# Patient Record
Sex: MALE | Race: WHITE | NOT HISPANIC OR LATINO | Employment: OTHER | URBAN - METROPOLITAN AREA
[De-identification: names, ages, dates, MRNs, and addresses within clinical notes are randomized per-mention and may not be internally consistent; named-entity substitution may affect disease eponyms.]

---

## 2017-02-21 ENCOUNTER — TRANSFERRED RECORDS (OUTPATIENT)
Dept: HEALTH INFORMATION MANAGEMENT | Facility: CLINIC | Age: 62
End: 2017-02-21

## 2018-05-23 ENCOUNTER — OFFICE VISIT (OUTPATIENT)
Dept: PEDIATRICS | Facility: CLINIC | Age: 63
End: 2018-05-23
Payer: COMMERCIAL

## 2018-05-23 VITALS
HEART RATE: 89 BPM | SYSTOLIC BLOOD PRESSURE: 134 MMHG | BODY MASS INDEX: 25.91 KG/M2 | WEIGHT: 171 LBS | HEIGHT: 68 IN | TEMPERATURE: 98.5 F | OXYGEN SATURATION: 95 % | DIASTOLIC BLOOD PRESSURE: 82 MMHG

## 2018-05-23 DIAGNOSIS — M79.10 MYALGIA: ICD-10-CM

## 2018-05-23 DIAGNOSIS — F41.9 ANXIETY: ICD-10-CM

## 2018-05-23 DIAGNOSIS — R68.89 ABNORMAL CLINICAL FINDING: ICD-10-CM

## 2018-05-23 DIAGNOSIS — R09.82 POST-NASAL DRIP: Primary | ICD-10-CM

## 2018-05-23 DIAGNOSIS — J34.2 DEVIATED NASAL SEPTUM: ICD-10-CM

## 2018-05-23 PROCEDURE — 82306 VITAMIN D 25 HYDROXY: CPT | Performed by: INTERNAL MEDICINE

## 2018-05-23 PROCEDURE — 80048 BASIC METABOLIC PNL TOTAL CA: CPT | Performed by: INTERNAL MEDICINE

## 2018-05-23 PROCEDURE — 36415 COLL VENOUS BLD VENIPUNCTURE: CPT | Performed by: INTERNAL MEDICINE

## 2018-05-23 PROCEDURE — 84443 ASSAY THYROID STIM HORMONE: CPT | Performed by: INTERNAL MEDICINE

## 2018-05-23 PROCEDURE — 99214 OFFICE O/P EST MOD 30 MIN: CPT | Performed by: INTERNAL MEDICINE

## 2018-05-23 PROCEDURE — 82550 ASSAY OF CK (CPK): CPT | Performed by: INTERNAL MEDICINE

## 2018-05-23 PROCEDURE — 84439 ASSAY OF FREE THYROXINE: CPT | Performed by: INTERNAL MEDICINE

## 2018-05-23 RX ORDER — AZELASTINE 1 MG/ML
1 SPRAY, METERED NASAL 2 TIMES DAILY
Qty: 1 BOTTLE | Refills: 3 | Status: SHIPPED | OUTPATIENT
Start: 2018-05-23 | End: 2018-08-08

## 2018-05-23 NOTE — PATIENT INSTRUCTIONS
INSTRUCTIONS FOR TODAY:     cough-suspect this is due to post nasal drip     Start using Astelin spray daily, start Claritin or Allegra daily      muscles aches- checking labwork.       Dr Smalls

## 2018-05-23 NOTE — MR AVS SNAPSHOT
"              After Visit Summary   5/23/2018    Michael Campbell    MRN: [id number]           Patient Information     Date Of Birth          1955        Visit Information        Provider Department      5/23/2018 1:20 PM Colby Smalls MD [facility]        Today's Diagnoses     Post-nasal drip    -  1    Deviated nasal septum        Myalgia          Care Instructions    INSTRUCTIONS FOR TODAY:     cough-suspect this is due to post nasal drip     Start using Astelin spray daily, start Claritin or Allegra daily      muscles aches- checking labwork.       Dr Smalls            Follow-ups after your visit        Follow-up notes from your care team     Return in about 2 weeks (around 6/6/2018), or if symptoms worsen or fail to improve.      Who to contact     If you have questions or need follow up information about today's clinic visit or your schedule please contact [facility] directly at [phone].  Normal or non-critical lab and imaging results will be communicated to you by MyChart, letter or phone within 4 business days after the clinic has received the results. If you do not hear from us within 7 days, please contact the clinic through MyChart or phone. If you have a critical or abnormal lab result, we will notify you by phone as soon as possible.  Submit refill requests through [employer] or call your pharmacy and they will forward the refill request to us. Please allow 3 business days for your refill to be completed.          Additional Information About Your Visit        Care EveryWhere ID     This is your Care EveryWhere ID. This could be used by other organizations to access your [facility] medical records  [id number]        Your Vitals Were     Pulse Temperature Height Pulse Oximetry BMI (Body Mass Index)       89 98.5  F (36.9  C) (Oral) 5' 8\" (1.727 m) 95% 26 kg/m2        Blood Pressure from Last 3 Encounters:   05/23/18 134/82   03/28/15 (!) 142/93   05/06/11 122/88    " Weight from Last 3 Encounters:   05/23/18 171 lb (77.6 kg)   03/28/15 175 lb (79.4 kg)   05/08/12 180 lb (81.6 kg)              We Performed the Following     Basic metabolic panel     CK total     TSH with free T4 reflex     Vitamin D Deficiency          Today's Medication Changes          These changes are accurate as of 5/23/18  1:49 PM.  If you have any questions, ask your nurse or doctor.               Start taking these medicines.        Dose/Directions    azelastine 0.1 % spray   Commonly known as:  ASTELIN   Used for:  Post-nasal drip   Started by:  Colby Smalls MD        Dose:  1 spray   Spray 1 spray into both nostrils 2 times daily   Quantity:  1 Bottle   Refills:  3            Where to get your medicines      These medications were sent to [id number] - [place], [place] - [address]  [address], [place] [address]     Phone:  [phone]     azelastine 0.1 % spray                Primary Care Provider Office Phone # Fax #    Colby Smalls [phone]       [facility] [place] [address]        Equal Access to Services     [facility] AH: Hadii angie ku hadasho Soomaali, waaxda luqadaha, qaybta kaalmada adelesteryaronel, paty starks . So [facility] [phone].    ATENCIÓN: Si habla español, tiene a rehman disposición servicios gratuitos de asistencia lingüística. [facility] [phone].    We comply with applicable federal civil rights laws and [place] laws. We do not discriminate on the basis of race, color, national origin, age, disability, sex, sexual orientation, or gender identity.            Thank you!     Thank you for choosing [facility]  for your care. Our goal is always to provide you with excellent care. Hearing back from our patients is one way we can continue to improve our services. Please take a few minutes to complete the written survey that you may receive in the mail after your visit with us. Thank  you!             Your Updated Medication List - Protect others around you: Learn how to safely use, store and throw away your medicines at www.disposemymeds.org.          This list is accurate as of 5/23/18  1:49 PM.  Always use your most recent med list.                   Brand Name Dispense Instructions for use Diagnosis    azelastine 0.1 % spray    ASTELIN    1 Bottle    Spray 1 spray into both nostrils 2 times daily    Post-nasal drip       clonazePAM 0.5 MG tablet    klonoPIN    21 tablet    Take 1 tablet (0.5 mg) by mouth 3 times daily as needed for anxiety Next refill on or after 7/1/11.    Anxiety       traZODone 100 MG tablet    DESYREL    10 tablet    Take 2 tablets by mouth At Bedtime.    Anxiety, Insomnia

## 2018-05-23 NOTE — PROGRESS NOTES
SUBJECTIVE:   Michael Campbell is a 62 year old male who presents to clinic today for the following health issues:    Nasal congestion, discharge      Duration: 4 months    Description:   Nasal congestion: YES  Sneezing: YES  Red, itchy eyes: YES  H/o deviated septum    Accompanying signs and symptoms: none    History (similar episodes/allergy testing): as above, intermittent sx         Hx of deviated septum    Precipitating or alleviating factors: None    Therapies tried and outcome: none    C/o generalized myalgias  Sx mainly shoulders, arms, also hips, legs  Sx for past few months    Anxiety disorder, managed through psychiatry  Has not tolerated SSRI.   Current regimen-- clonazepam  Recent psychiatry notes reviewed, recent reduction in dose  Pt reports significant anxiety off medication    Patient Active Problem List   Diagnosis     Anxiety     Insomnia     CARDIOVASCULAR SCREENING; LDL GOAL LESS THAN 160     Hypertension     No past surgical history on file.    Social History   Substance Use Topics     Smoking status: Current Every Day Smoker     Packs/day: 0.50     Types: Cigarettes     Smokeless tobacco: Never Used     Alcohol use No     Family History   Problem Relation Age of Onset     Arthritis Mother      Rheumatoid          Current Outpatient Prescriptions   Medication Sig Dispense Refill     azelastine (ASTELIN) 0.1 % spray Spray 1 spray into both nostrils 2 times daily 1 Bottle 3     clonazePAM (KLONOPIN) 0.5 MG tablet Take 1 tablet (0.5 mg) by mouth 3 times daily as needed for anxiety Next refill on or after 7/1/11. 21 tablet 0     TRAZodone (DESYREL) 100 MG tablet Take 2 tablets by mouth At Bedtime. 10 tablet 0       ROS: The following systems have been completely reviewed and are negative except as noted in the HPI: CONSTITUTIONAL, HEAD AND NECK,  NEUROLOGIC,  PSYCHIATRIC.     OBJECTIVE:                                                    /82 (Cuff Size: Adult Regular)  Pulse 89  Temp 98.5  " F (36.9  C) (Oral)  Ht 5' 8\" (1.727 m)  Wt 171 lb (77.6 kg)  SpO2 95%  BMI 26 kg/m2 Body mass index is 26 kg/(m^2).  GENERAL:  alert,  no distress  HENT: ear canals- normal; TMs- normal; oropharynx-normal  NECK: no tenderness, no adenopathy  RESP: lungs clear to auscultation - no rales, no rhonchi, no wheezes  CV: regular rates and rhythm, normal S1 S2, no S3 or S4 and no murmur, no click or rub   MS: extremities- no edema, joints without effusions or erythema  Derm: without rash     ASSESSMENT/PLAN:                                                        ICD-10    1. Post-nasal drip R09.82 azelastine (ASTELIN) 0.1 % spray         Start trial of astelin     2. Deviated nasal septum J34.2 Discussed ENT referral regarding deviated septum  if sx persist     3. Myalgia M79.1 Vitamin D Deficiency     Basic metabolic panel     CK total     TSH with free T4 reflex       Additional labwork as noted     4. Anxiety F41.9 Managed by psychiatry, continue clonazepam      Colby Smalls MD  [facility]     "

## 2018-05-24 LAB
ANION GAP SERPL CALCULATED.3IONS-SCNC: 4 MMOL/L (ref 3–14)
BUN SERPL-MCNC: 9 MG/DL (ref 7–30)
CALCIUM SERPL-MCNC: 9.6 MG/DL (ref 8.5–10.1)
CHLORIDE SERPL-SCNC: 103 MMOL/L (ref 94–109)
CK SERPL-CCNC: 189 U/L (ref 30–300)
CO2 SERPL-SCNC: 31 MMOL/L (ref 20–32)
CREAT SERPL-MCNC: 1.18 MG/DL (ref 0.66–1.25)
DEPRECATED CALCIDIOL+CALCIFEROL SERPL-MC: 49 UG/L (ref 20–75)
GFR SERPL CREATININE-BSD FRML MDRD: 62 ML/MIN/1.7M2
GLUCOSE SERPL-MCNC: 91 MG/DL (ref 70–99)
POTASSIUM SERPL-SCNC: 5.1 MMOL/L (ref 3.4–5.3)
SODIUM SERPL-SCNC: 138 MMOL/L (ref 133–144)
T4 FREE SERPL-MCNC: 0.77 NG/DL (ref 0.76–1.46)
TSH SERPL DL<=0.005 MIU/L-ACNC: 4.41 MU/L (ref 0.4–4)

## 2018-07-28 ENCOUNTER — TRANSFERRED RECORDS (OUTPATIENT)
Dept: HEALTH INFORMATION MANAGEMENT | Facility: CLINIC | Age: 63
End: 2018-07-28

## 2018-08-08 ENCOUNTER — OFFICE VISIT (OUTPATIENT)
Dept: PEDIATRICS | Facility: CLINIC | Age: 63
End: 2018-08-08
Payer: COMMERCIAL

## 2018-08-08 VITALS
SYSTOLIC BLOOD PRESSURE: 136 MMHG | TEMPERATURE: 98.4 F | OXYGEN SATURATION: 95 % | BODY MASS INDEX: 26.25 KG/M2 | HEIGHT: 68 IN | DIASTOLIC BLOOD PRESSURE: 82 MMHG | HEART RATE: 69 BPM | WEIGHT: 173.2 LBS

## 2018-08-08 DIAGNOSIS — G47.00 INSOMNIA, UNSPECIFIED TYPE: ICD-10-CM

## 2018-08-08 DIAGNOSIS — Z11.59 NEED FOR HEPATITIS C SCREENING TEST: ICD-10-CM

## 2018-08-08 DIAGNOSIS — I10 ESSENTIAL HYPERTENSION: ICD-10-CM

## 2018-08-08 DIAGNOSIS — R68.89 EXERCISE INTOLERANCE: ICD-10-CM

## 2018-08-08 DIAGNOSIS — F41.9 ANXIETY: ICD-10-CM

## 2018-08-08 DIAGNOSIS — M79.10 MUSCLE PAIN: ICD-10-CM

## 2018-08-08 DIAGNOSIS — R53.83 OTHER FATIGUE: Primary | ICD-10-CM

## 2018-08-08 DIAGNOSIS — Z11.4 SCREENING FOR HIV (HUMAN IMMUNODEFICIENCY VIRUS): ICD-10-CM

## 2018-08-08 LAB
CRP SERPL-MCNC: <2.9 MG/L (ref 0–8)
ERYTHROCYTE [SEDIMENTATION RATE] IN BLOOD BY WESTERGREN METHOD: 9 MM/H (ref 0–20)
VIT B12 SERPL-MCNC: 375 PG/ML (ref 193–986)

## 2018-08-08 PROCEDURE — 99214 OFFICE O/P EST MOD 30 MIN: CPT | Performed by: INTERNAL MEDICINE

## 2018-08-08 PROCEDURE — 86803 HEPATITIS C AB TEST: CPT | Performed by: INTERNAL MEDICINE

## 2018-08-08 PROCEDURE — 86665 EPSTEIN-BARR CAPSID VCA: CPT | Mod: 59 | Performed by: INTERNAL MEDICINE

## 2018-08-08 PROCEDURE — 82607 VITAMIN B-12: CPT | Performed by: INTERNAL MEDICINE

## 2018-08-08 PROCEDURE — 86618 LYME DISEASE ANTIBODY: CPT | Performed by: INTERNAL MEDICINE

## 2018-08-08 PROCEDURE — 36415 COLL VENOUS BLD VENIPUNCTURE: CPT | Performed by: INTERNAL MEDICINE

## 2018-08-08 PROCEDURE — 86140 C-REACTIVE PROTEIN: CPT | Performed by: INTERNAL MEDICINE

## 2018-08-08 PROCEDURE — 85652 RBC SED RATE AUTOMATED: CPT | Performed by: INTERNAL MEDICINE

## 2018-08-08 PROCEDURE — 82550 ASSAY OF CK (CPK): CPT | Performed by: INTERNAL MEDICINE

## 2018-08-08 PROCEDURE — 86665 EPSTEIN-BARR CAPSID VCA: CPT | Performed by: INTERNAL MEDICINE

## 2018-08-08 PROCEDURE — 80053 COMPREHEN METABOLIC PANEL: CPT | Performed by: INTERNAL MEDICINE

## 2018-08-08 PROCEDURE — 87389 HIV-1 AG W/HIV-1&-2 AB AG IA: CPT | Performed by: INTERNAL MEDICINE

## 2018-08-08 RX ORDER — GREEN TEA/HOODIA GORDONII 315-12.5MG
CAPSULE ORAL
COMMUNITY
End: 2018-12-01

## 2018-08-08 RX ORDER — LISINOPRIL 10 MG/1
10 TABLET ORAL DAILY
Qty: 30 TABLET | Refills: 1 | COMMUNITY
Start: 2018-08-08 | End: 2018-12-01

## 2018-08-08 RX ORDER — LISINOPRIL AND HYDROCHLOROTHIAZIDE 12.5; 2 MG/1; MG/1
1 TABLET ORAL DAILY
COMMUNITY
Start: 2018-02-15 | End: 2018-12-01

## 2018-08-08 RX ORDER — CLONAZEPAM 1 MG/1
1.5 TABLET ORAL 2 TIMES DAILY
COMMUNITY
Start: 2018-07-31 | End: 2018-12-01

## 2018-08-08 NOTE — PATIENT INSTRUCTIONS
Can stop blood pressure pill for 3-4 days, keep an eye on blood pressure. If to too high (150/100) then I need to know. See if sx resolve.

## 2018-08-08 NOTE — MR AVS SNAPSHOT
"              After Visit Summary   8/8/2018    Michael Campbell    MRN: [id number]           Patient Information     Date Of Birth          1955        Visit Information        Provider Department      8/8/2018 2:40 PM Nasreen Hoskins MD [facility]        Today's Diagnoses     Other fatigue    -  1    Muscle pain        Anxiety        Essential hypertension        Insomnia, unspecified type        Need for hepatitis C screening test        Screening for HIV (human immunodeficiency virus)          Care Instructions    Can stop blood pressure pill for 3-4 days, keep an eye on blood pressure. If to too high (150/100) then I need to know. See if sx resolve.           Follow-ups after your visit        Follow-up notes from your care team     Return in about 1 month (around 9/8/2018), or if symptoms worsen or fail to improve.      Who to contact     If you have questions or need follow up information about today's clinic visit or your schedule please contact [facility] directly at [phone].  Normal or non-critical lab and imaging results will be communicated to you by MyChart, letter or phone within 4 business days after the clinic has received the results. If you do not hear from us within 7 days, please contact the clinic through [employer] or phone. If you have a critical or abnormal lab result, we will notify you by phone as soon as possible.  Submit refill requests through [employer] or call your pharmacy and they will forward the refill request to us. Please allow 3 business days for your refill to be completed.          Additional Information About Your Visit        Care EveryWhere ID     This is your Care EveryWhere ID. This could be used by other organizations to access your [facility] medical records  [id number]        Your Vitals Were     Pulse Temperature Height Pulse Oximetry BMI (Body Mass Index)       69 98.4  F (36.9  C) (Oral) 5' 7.5\" (1.715 m) 95% 26.73 kg/m2        " Blood Pressure from Last 3 Encounters:   08/08/18 136/82   05/23/18 134/82   03/28/15 (!) 142/93    Weight from Last 3 Encounters:   08/08/18 173 lb 3.2 oz (78.6 kg)   05/23/18 171 lb (77.6 kg)   03/28/15 175 lb (79.4 kg)              We Performed the Following     CK total     Comprehensive metabolic panel     CRP inflammation     EBV Capsid Antibody IgG     EBV Capsid Antibody IgM     Erythrocyte sedimentation rate auto     Hepatitis C Screen Reflex to HCV RNA Quant and Genotype     HIV Screening     Lyme Confirm IgG by Immunoblot     Vitamin B12          Today's Medication Changes          These changes are accurate as of 8/8/18  3:09 PM.  If you have any questions, ask your nurse or doctor.               These medicines have changed or have updated prescriptions.        Dose/Directions    clonazePAM 1 MG tablet   Commonly known as:  klonoPIN   This may have changed:  Another medication with the same name was removed. Continue taking this medication, and follow the directions you see here.   Changed by:  Nasreen Hoskins MD        Dose:  1.5 tablet   1.5 tablets by Oral or Feeding Tube route 2 times daily   Refills:  0         Stop taking these medicines if you haven't already. Please contact your care team if you have questions.     azelastine 0.1 % spray   Commonly known as:  ASTELIN   Stopped by:  Nasreen Hoskins MD                    Primary Care Provider Office Phone # Fax #    [facility] [phone] [address] [address]        Equal Access to Services     [facility] AH: Hadii angie lomeli hadasho Sogifty, waaxda luqadaha, qaybta kaalmada halleyada, paty smith. So [facility] [phone].    ATENCIÓN: Si habla español, tiene a rehman disposición servicios gratuitos de asistencia lingüística. Llame al [phone].    We comply with applicable federal civil rights laws and [place] laws. We do not discriminate on the  basis of race, color, national origin, age, disability, sex, sexual orientation, or gender identity.            Thank you!     Thank you for choosing [facility]  for your care. Our goal is always to provide you with excellent care. Hearing back from our patients is one way we can continue to improve our services. Please take a few minutes to complete the written survey that you may receive in the mail after your visit with us. Thank you!             Your Updated Medication List - Protect others around you: Learn how to safely use, store and throw away your medicines at www.disposemymeds.org.          This list is accurate as of 8/8/18  3:09 PM.  Always use your most recent med list.                   Brand Name Dispense Instructions for use Diagnosis    B-12 500 MCG Subl           clonazePAM 1 MG tablet    klonoPIN     1.5 tablets by Oral or Feeding Tube route 2 times daily        lisinopril 10 MG tablet    PRINIVIL/ZESTRIL    30 tablet    Take 1 tablet (10 mg) by mouth daily        lisinopril-hydrochlorothiazide 20-12.5 MG per tablet    PRINZIDE/ZESTORETIC     1 tablet by Oral or Feeding Tube route daily        traZODone 100 MG tablet    DESYREL    10 tablet    Take 2 tablets by mouth At Bedtime.    Anxiety, Insomnia

## 2018-08-08 NOTE — LETTER
[facility]  [address]  [place] [address]                  [phone]   August 10, 2018    Michael LIN Adrian  [address]  [place] [address]      Shane Rodriguez,   I have the results of your labs for your review.     Your lyme disease test was negative.  Considering the other negative test in June this rules out lyme as a cause of your symptoms.       Your mono testing shows that you have had mono in the past and are immune to mono.  It is not uncommon to have had mono in the past and not realize it.       Your HIV and hep c testing is negative.       Your liver and kidney functions are normal.       Your muscle enzyme levels are normal.  I suspect your previous elevations were due to testing not long after a work out.       Your b12 level is normal.     Your tests of inflammation are both normal.  This makes an autoimmune disease like lupus or rheumatoid arthritis MUCH less likely.       I don't see a clear cause for your symptoms on this blood work.  As we discussed in clinic I think the next steps here would be to do a sleep study and a stress test, with a follow up visit with me 1-2 weeks after the sleep study.     Please let me know if you have questions or concerns.     Nasreen Hoskins MD     Results for orders placed or performed in visit on 08/08/18   Hepatitis C Screen Reflex to HCV RNA Quant and Genotype   Result Value Ref Range    Hepatitis C Antibody Nonreactive NR^Nonreactive   HIV Screening   Result Value Ref Range    HIV Antigen Antibody Combo Nonreactive NR^Nonreactive       Erythrocyte sedimentation rate auto   Result Value Ref Range    Sed Rate 9 0 - 20 mm/h   CRP inflammation   Result Value Ref Range    CRP Inflammation <2.9 0.0 - 8.0 mg/L   Comprehensive metabolic panel   Result Value Ref Range    Sodium 136 133 - 144 mmol/L    Potassium 4.2 3.4 - 5.3 mmol/L    Chloride 101 94 - 109 mmol/L    Carbon Dioxide 28 20 - 32 mmol/L    Anion Gap 7 3 -  14 mmol/L    Glucose 93 70 - 99 mg/dL    Urea Nitrogen 8 7 - 30 mg/dL    Creatinine 1.06 0.66 - 1.25 mg/dL    GFR Estimate 70 >60 mL/min/1.7m2    GFR Estimate If Black 85 >60 mL/min/1.7m2    Calcium 8.9 8.5 - 10.1 mg/dL    Bilirubin Total 0.5 0.2 - 1.3 mg/dL    Albumin 4.3 3.4 - 5.0 g/dL    Protein Total 7.8 6.8 - 8.8 g/dL    Alkaline Phosphatase 69 40 - 150 U/L    ALT 27 0 - 70 U/L    AST 30 0 - 45 U/L   EBV Capsid Antibody IgM   Result Value Ref Range    EBV Capsid Antibody IgM <0.2 0.0 - 0.8 AI   EBV Capsid Antibody IgG   Result Value Ref Range    EBV Capsid Antibody IgG 6.5 (H) 0.0 - 0.8 AI   Vitamin B12   Result Value Ref Range    Vitamin B12 375 193 - 986 pg/mL   CK total   Result Value Ref Range    CK Total 192 30 - 300 U/L   Lyme Disease Maggie with reflex to WB Serum   Result Value Ref Range    Lyme Disease Antibodies Serum 0.05 0.00 - 0.89

## 2018-08-08 NOTE — PROGRESS NOTES
"  SUBJECTIVE:   Michael Campbell is a 62 year old male who presents to clinic today for the following health issues:      Fatigue, headache, muscle cramps      Duration: For \"some time\", at least a month     Description (location/character/radiation): Sleeping, having daily headaches, daily cramps in calves, thighs, and shoulders     Intensity:  moderate    Accompanying signs and symptoms: No fever    History (similar episodes/previous evaluation): None    Precipitating or alleviating factors: None    Therapies tried and outcome: None         Patient reports he has fatigue, global, sharp headaches daily, are alleviated at night but comes back in the morning. Is accompanied by daily stomach cramps, generalized muscle cramps. Sometimes is woken up with jessica horses.  Started about 4 months ago, notes sx started \"all of the sudden\". Notes last time he worked out he needed an IV since he was \"so fatigued\". Physician suggested he got more blood work done, mentioned Lupus or Lymes. Wakes up not feeling well, confused as to why he \"feels this way\". Still feels tired in the morning. Goes to bed sometimes around 7PM and wakes around 1 PM, tosses and turns since he cannot get back to bed. Roommate works at 6 AM, wakes up again and is unable to fall back to sleep. Does not fall asleep easily, if he takes a nap he wakes up with a headache. Denies snoring. Lately he has been having nightmares. Sx do not limit mobility or exercise, fatigue slows him down. Has decreased endurance when running, no more shortness of breath, chest heaviness. Feels a bit better when exercising.  Does cardio and weights for 45 min per day almost daily. Denies chest pain, shortness of breath with exercise. Occasional smoker, only when watching sports about 2x a week accompanied by 3-4 light beers.     Also notes he has had increase generalized itching that started same time as other sx.     Has been on lisinopril for 3-4 months, about the same time sx " "started. BP in clinic was 136/82; weight was 173 lbs. Uses trazodone 300 MG nightly for sleep. Weaning off clonidine for \"not too long\".     Notes [place] was recently [place] in [place]. States [place] tick up north, 1st week in July.      Denies FHx heart problems. Notes he had a sleep study a few years ago and was clean.     Problem list and histories reviewed & adjusted, as indicated.  Additional history: as documented    Patient Active Problem List   Diagnosis     Anxiety     Insomnia     CARDIOVASCULAR SCREENING; LDL GOAL LESS THAN 160     Hypertension     History reviewed. No pertinent surgical history.    Social History   Substance Use Topics     Smoking status: Current Every Day Smoker     Packs/day: 0.50     Types: Cigarettes     Smokeless tobacco: Never Used     Alcohol use No     Family History   Problem Relation Age of Onset     Arthritis Mother      Rheumatoid      Alzheimer Disease Father          Current Outpatient Prescriptions   Medication Sig Dispense Refill     Cyanocobalamin (B-12) 500 MCG SUBL        TRAZodone (DESYREL) 100 MG tablet Take 2 tablets by mouth At Bedtime. 10 tablet 0     azelastine (ASTELIN) 0.1 % spray Spray 1 spray into both nostrils 2 times daily (Patient not taking: Reported on 8/8/2018) 1 Bottle 3     clonazePAM (KLONOPIN) 0.5 MG tablet Take 1 tablet (0.5 mg) by mouth 3 times daily as needed for anxiety Next refill on or after 7/1/11. 21 tablet 0     Allergies   Allergen Reactions     Codeine      Sudafed [Pseudoephedrine]      Vicodin [Hydrocodone-Acetaminophen]      BP Readings from Last 3 Encounters:   08/08/18 136/82   05/23/18 134/82   03/28/15 (!) 142/93    Wt Readings from Last 3 Encounters:   08/08/18 78.6 kg (173 lb 3.2 oz)   05/23/18 77.6 kg (171 lb)   03/28/15 79.4 kg (175 lb)                  Labs reviewed in EPIC    Reviewed and updated as needed this visit by clinical staff       Reviewed and updated as " "needed this visit by Provider         ROS:  Constitutional, HEENT, cardiovascular, pulmonary, GI, , musculoskeletal, neuro, skin, endocrine and psych systems are negative, except as otherwise noted.    This document serves as a record of the services and decisions personally performed and made by Nasreen Hoskins MD. It was created on her behalf by Mckayla Campoverde, a trained medical scribe. The creation of this document is based the provider's statements to the medical scribe.    Mckayla Campoverde August 8, 2018 2:36 PM  OBJECTIVE:     /82 (BP Location: Right arm, Patient Position: Sitting, Cuff Size: Adult Regular)  Pulse 69  Temp 98.4  F (36.9  C) (Oral)  Ht 1.715 m (5' 7.5\")  Wt 78.6 kg (173 lb 3.2 oz)  SpO2 95%  BMI 26.73 kg/m2  Body mass index is 26.73 kg/(m^2).  GENERAL: alert and no distress  HENT: ear canals and TM's normal, nose and mouth without ulcers or lesions  NECK: no adenopathy, no asymmetry, masses, or scars and thyroid normal to palpation  RESP: lungs clear to auscultation - no rales, rhonchi or wheezes  CV: regular rate and rhythm, normal S1 S2, no S3 or S4, no murmur, click or rub, no peripheral edema and peripheral pulses strong  ABDOMEN: soft, nontender, no hepatosplenomegaly, no masses and bowel sounds normal  MS: no gross musculoskeletal defects noted, no edema  PSYCH: mentation appears normal, affect normal/bright    Diagnostic Test Results:  No results found for this or any previous visit (from the past 24 hour(s)).    ASSESSMENT/PLAN:   2:35-3:09      (R53.83) Other fatigue  (primary encounter diagnosis)  -- discussed the ddx with patient including, MARION, atypical presentation of heart disease, lymes, thyroid, mono   -- discussed with patient the need to look at broad picture since fatigue is an non specific sx; pt is quite fixated on this being lyme.   -- will check lymes titers to r/o lymes; anemia and infection   -- is also weaning off clonazepam and could be withdrawal sx " "  -- did have abnormal TSH in June 2018   -- HIV,Hep C and mono screens today   -- per patient had sleep study a few years ago and was clear, was unable to find records   -- will make decision about sleep study after lab results come back   Plan: Erythrocyte sedimentation rate auto, CRP         inflammation, Comprehensive metabolic panel,         EBV Capsid Antibody IgM, EBV Capsid Antibody         IgG, Vitamin B12, Lyme Disease Maggie with reflex         to WB Serum, CANCELED: Lyme Confirm IgG by         Immunoblot    Exercise intolerance  -pt notes decreased ability to exercise and decreasing his time on the treadmill due to fatigue.   -recommended stress test, pt refuses stating \"I don't think this is my heart\"    Addendum:  Labs returned normal, pt was then willing to do stress test.  Orders placed.        (M79.1) Muscle pain  -elevated CK at outside facility but did lift weights shortly before lab drawn   -other labs as above   Plan: CK total           (F41.9) Anxiety  -I suspect this is a large contributor to symptoms  -pt very fixated on symptoms, finding a quick fix  -he is fixated on diagnosis of lyme as well     (I10) Essential hypertension  --pt did start lisinopril around the time symtpoms began   -- possible side effect of lisinopril, advised patient he can come off medication for 3-4 days but needs to check BP and contact me if high     (G47.00) Insomnia, unspecified type  -suspect this is related to anxiety  -could have a sleep apnea component; patient refusing to consider this stating he had a sleep study \"years ago\" that was \"not helpful\"     (Z11.59) Need for hepatitis C screening test  Plan: Hepatitis C Screen Reflex to HCV RNA Quant and         Genotype      (Z11.4) Screening for HIV (human immunodeficiency virus)  Plan: HIV Screening       Encouraged patient to f/u with one provider to help with continuity and following work up.  Recommended patient return in 1 month for f/u visit.        The " information in this document, created by the medical scribe for me, accurately reflects the services I personally performed and the decisions made by me. I have reviewed and approved this document for accuracy prior to leaving the patient care area.  Nasreen Hoskins MD  [facility]

## 2018-08-09 LAB
ALBUMIN SERPL-MCNC: 4.3 G/DL (ref 3.4–5)
ALP SERPL-CCNC: 69 U/L (ref 40–150)
ALT SERPL W P-5'-P-CCNC: 27 U/L (ref 0–70)
ANION GAP SERPL CALCULATED.3IONS-SCNC: 7 MMOL/L (ref 3–14)
AST SERPL W P-5'-P-CCNC: 30 U/L (ref 0–45)
BILIRUB SERPL-MCNC: 0.5 MG/DL (ref 0.2–1.3)
BUN SERPL-MCNC: 8 MG/DL (ref 7–30)
CALCIUM SERPL-MCNC: 8.9 MG/DL (ref 8.5–10.1)
CHLORIDE SERPL-SCNC: 101 MMOL/L (ref 94–109)
CK SERPL-CCNC: 192 U/L (ref 30–300)
CO2 SERPL-SCNC: 28 MMOL/L (ref 20–32)
CREAT SERPL-MCNC: 1.06 MG/DL (ref 0.66–1.25)
GFR SERPL CREATININE-BSD FRML MDRD: 70 ML/MIN/1.7M2
GLUCOSE SERPL-MCNC: 93 MG/DL (ref 70–99)
HCV AB SERPL QL IA: NONREACTIVE
HIV 1+2 AB+HIV1 P24 AG SERPL QL IA: NONREACTIVE
POTASSIUM SERPL-SCNC: 4.2 MMOL/L (ref 3.4–5.3)
PROT SERPL-MCNC: 7.8 G/DL (ref 6.8–8.8)
SODIUM SERPL-SCNC: 136 MMOL/L (ref 133–144)

## 2018-08-10 ENCOUNTER — TELEPHONE (OUTPATIENT)
Dept: PEDIATRICS | Facility: CLINIC | Age: 63
End: 2018-08-10

## 2018-08-10 LAB
B BURGDOR IGG+IGM SER QL: 0.05 (ref 0–0.89)
EBV VCA IGG SER QL IA: 6.5 AI (ref 0–0.8)
EBV VCA IGM SER QL IA: <0.2 AI (ref 0–0.8)

## 2018-08-10 NOTE — TELEPHONE ENCOUNTER
Dr. Hoskins:     The Pt called in for his lab results, which I did provide him (including your result note)  The Pt declined to do a sleep study, states he had one many years ago and didn't feel it showed anything useful.   As of now, would like to think about whether he wants to have a stress test done or not.   He reports he will call back into the clinic next week with a decision.     Jessica Jones RN -- [facility]

## 2018-08-11 ENCOUNTER — TELEPHONE (OUTPATIENT)
Dept: PEDIATRICS | Facility: CLINIC | Age: 63
End: 2018-08-11

## 2018-08-11 NOTE — TELEPHONE ENCOUNTER
Reason for Call: Request for an order or referral:    Order or referral being requested: Stress test    Date needed: as soon as possible    Has the patient been seen by the PCP for this problem? YES    Additional comments: Would like the order put into [facility] so he can have the stress test done as soon as possible.    Phone number Patient can be reached at:  Home number on file [phone] (home)    Best Time:  Late morning, Early afternoon    Can we leave a detailed message on this number?  YES    Call taken on 8/11/2018 at 1:43 PM by Mariella Devlin

## 2018-08-12 NOTE — TELEPHONE ENCOUNTER
Order placed.  Please notify patient that they should call him to schedule this.   Nasreen Hoskins MD

## 2018-08-16 ENCOUNTER — TELEPHONE (OUTPATIENT)
Dept: PEDIATRICS | Facility: CLINIC | Age: 63
End: 2018-08-16

## 2018-08-16 NOTE — TELEPHONE ENCOUNTER
Patient is calling to ask why a stress test was ordered?  Reviewed office appointment note and this was recommended due to fatigue and decreased exercise tolerance.  Patient advised of this, and states that he doesn't think this is his heart, but agreed to proceed with testing.  DANICA Pagan RN

## 2018-09-01 ENCOUNTER — NURSE TRIAGE (OUTPATIENT)
Dept: NURSING | Facility: CLINIC | Age: 63
End: 2018-09-01

## 2018-09-01 ENCOUNTER — TELEPHONE (OUTPATIENT)
Dept: PEDIATRICS | Facility: CLINIC | Age: 63
End: 2018-09-01

## 2018-09-01 NOTE — TELEPHONE ENCOUNTER
"Clinic Action Needed:  Please contact patient at [phone]; okay to leave detailed message at that number.  Reason for Call:  Patient states he is in [place] now; returning in about a week.  States yesterday he felt faint and was \"swaying\".  Denies any symptoms now.  States an acquaintance told him he may have a heart blockage.  Patient is now fixating on it.  FNA advised patient to call back if experiencing any symptoms.  Patient doesn't know why provider recommended stress test as he has no history of heart problems and would like to know if there was some red flag that indicated the need for the stress test.     Routed to:  PCP Pool  "

## 2018-09-01 NOTE — TELEPHONE ENCOUNTER
"Patient states he is in [place] now; returning in about a week.  States yesterday he felt faint and was \"swaying\".  Denies any symptoms now.  States an acquaintance told him he may have a heart blockage.  Patient is now fixating on it.  FNA advised patient to call back if experiencing any symptoms.  Patient doesn't know why provider recommended stress test as he has no history of heart problems and would like to know if there was some red flag that indicated the need for the stress test.   Patient also stated that he had 3 pieces of spicy pizza last night and did have what sounded like heartburn.  FNA discussed avoidance of such foods later in the evening.  Routed to PCP Pool.    "

## 2018-09-04 NOTE — TELEPHONE ENCOUNTER
He told me that he was having decreased exercise tolerance due to his fatigue- he was not running as long on the treadmill and stopping because he was tired.  That's why I recommended the stress test.  If he would like to discuss symptoms further he can schedule an appointment; the stress test order is in place he simply needs to call to schedule it.  Nasreen Hoskins MD

## 2018-09-20 ENCOUNTER — TRANSFERRED RECORDS (OUTPATIENT)
Dept: HEALTH INFORMATION MANAGEMENT | Facility: CLINIC | Age: 63
End: 2018-09-20

## 2018-09-22 ENCOUNTER — TRANSFERRED RECORDS (OUTPATIENT)
Dept: HEALTH INFORMATION MANAGEMENT | Facility: CLINIC | Age: 63
End: 2018-09-22

## 2018-11-30 ENCOUNTER — NURSE TRIAGE (OUTPATIENT)
Dept: NURSING | Facility: CLINIC | Age: 63
End: 2018-11-30

## 2018-11-30 NOTE — TELEPHONE ENCOUNTER
"Patient will run out of his Lorazepam this weekend and he does not see his Psychiatrist in [place] again until the end of next week and the psychiatrist refused to renew his Lorazepam until he comes ot his next appointment. He went to [facility] ED and waited for 3 1/2 to see the PA there and was denied a refill of the Lorazepam by that provider also. He wants to know if he goes to the [place] ER tomorrow will some one be able to help him. Advised that he should re-contact his [place] psychiatrist and explain that he will not have enough to last until his next appointment but patient does not sound like he wants to do that because he was already refused the refill by his psychiatrist. Also advised that it would have to be determined by the individual provider in the ED what they felt was most appropriate for any medications in an ED setting. Patient seemed unsure what he was going to do and discontinued the call.     Reason for Disposition    [1] Request for URGENT new prescription or refill of \"essential\" medication (i.e., likelihood of harm to patient if not taken) AND [2] triager unable to fill per unit policy    Protocols used: MEDICATION QUESTION CALL-ADULT-AH      "

## 2018-12-01 ENCOUNTER — HOSPITAL ENCOUNTER (EMERGENCY)
Facility: CLINIC | Age: 63
Discharge: HOME | End: 2018-12-01
Attending: EMERGENCY MEDICINE | Admitting: EMERGENCY MEDICINE
Payer: COMMERCIAL

## 2018-12-01 VITALS
DIASTOLIC BLOOD PRESSURE: 98 MMHG | OXYGEN SATURATION: 92 % | BODY MASS INDEX: 26.5 KG/M2 | TEMPERATURE: 99.3 F | SYSTOLIC BLOOD PRESSURE: 142 MMHG | HEART RATE: 109 BPM | WEIGHT: 171.74 LBS | RESPIRATION RATE: 16 BRPM

## 2018-12-01 DIAGNOSIS — F13.930 BENZODIAZEPINE WITHDRAWAL WITHOUT COMPLICATION (H): ICD-10-CM

## 2018-12-01 PROCEDURE — 99283 EMERGENCY DEPT VISIT LOW MDM: CPT

## 2018-12-01 PROCEDURE — 25000132 ZZH RX MED GY IP 250 OP 250 PS 637: Performed by: EMERGENCY MEDICINE

## 2018-12-01 RX ORDER — LORAZEPAM 1 MG/1
1 TABLET ORAL ONCE
Status: COMPLETED | OUTPATIENT
Start: 2018-12-01 | End: 2018-12-01

## 2018-12-01 RX ORDER — LORAZEPAM 0.5 MG/1
.5-1 TABLET ORAL EVERY 8 HOURS PRN
Qty: 15 TABLET | Refills: 0 | Status: SHIPPED | OUTPATIENT
Start: 2018-12-01 | End: 2018-12-06

## 2018-12-01 RX ADMIN — LORAZEPAM 1 MG: 1 TABLET ORAL at 10:39

## 2018-12-01 ASSESSMENT — ENCOUNTER SYMPTOMS
NERVOUS/ANXIOUS: 1
TREMORS: 1

## 2018-12-01 NOTE — ED TRIAGE NOTES
Normally takes klonopin TID.  Hasn't had any for a few days.  Called clinic yesterday, can't get in with them until Thursday.  Feeling jittery.  ABCDs intact.

## 2018-12-01 NOTE — ED PROVIDER NOTES
History     Chief Complaint:  Klonopin withdrawal     The history is provided by the patient.      Michael Campbell is a 63 year old male who presents with Klonopin withdrawal. The patient takes Klonopin above his prescribed dosage and is now out of his prescription. He has not taken Klonopin for a few days and presents to the ED complaining of Klonopin withdrawal. He would like Ativan to help with his symptoms.     Allergies:  Codeine  Sudafed [Pseudoephedrine]  Vicodin [Hydrocodone-Acetaminophen]      Medications:    Klonopin  Ativan  Desyrel    Past Medical History:    Alcoholism   Anxiety  Substance abuse    Past Surgical History:    Testicular torsion surgery    Family History:    Rheumatoid arthritis  Alzheimer's Disease    Social History:  Smoking Status: Former Smoker  Alcohol Use: Former  Patient presents by himself.   Marital Status:  Single      Review of Systems   Neurological: Positive for tremors.   Psychiatric/Behavioral: The patient is nervous/anxious.    All other systems reviewed and are negative.    Physical Exam     Patient Vitals for the past 24 hrs:   BP Temp Temp src Pulse Resp SpO2 Weight   12/01/18 1130 (!) 142/98 - - - - 92 % -   12/01/18 1115 (!) 156/113 - - - - 92 % -   12/01/18 1100 (!) 151/110 - - - - 93 % -   12/01/18 1045 (!) 160/109 - - - - 93 % -   12/01/18 1030 (!) 164/106 - - - - 93 % -   12/01/18 0944 (!) 162/128 99.3  F (37.4  C) Temporal 109 16 94 % 77.9 kg (171 lb 11.8 oz)      Physical Exam  Constitutional: Vital signs reviewed as above.   Head: No external signs of trauma noted.  Eyes: Pupils are equal, round, and reactive to light.   Neck: No JVD noted  Cardiovascular: Normal rate, regular rhythm and normal heart sounds.  No murmur heard. Equal B/L peripheral pulses.  Pulmonary/Chest: Effort normal and breath sounds normal. No respiratory distress. Patient has no wheezes. Patient has no rales.   Gastrointestinal: Soft. There is no tenderness.   Musculoskeletal/Extremities:  No edema noted. Normal tone.  Neurological: Patient is alert. GCS 15  Skin: Skin is warm and dry. There is no diaphoresis noted.   Psychiatric: The patient appears anxious.      Emergency Department Course     Interventions:  1039 - Ativan 1 mg tablet PO    Emergency Department Course:  Past medical records, nursing notes, and vitals reviewed.  1030: I performed an exam of the patient and obtained history, as documented above.     I rechecked the patient.  Findings and plan explained to the Patient. Patient discharged home with instructions regarding supportive care, medications, and reasons to return. The importance of close follow-up was reviewed.      Impression & Plan      Medical Decision Making:  This 63-year-old male patient presents the ED due to concerns for benzodiazepine withdrawal.  Please see the HPI and exam for specifics.  The patient's is out of his Klonopin and will be seeing a new primary care physician on Thursday so he was unable to have any meds called in.  The patient seemed anxious and it is possible he was having some withdrawals from benzodiazepine use.  He seemed to think that he would not be able to get Klonopin refilled as he had been on this but I did give him a small supply of Ativan though I told him that I would not be able to give him a long-term supply and that further refills needed to come from his primary care clinic.  Anticipatory guidance given prior to discharge.    Critical Care time:  none    Diagnosis:    ICD-10-CM    1. Benzodiazepine withdrawal without complication (H) F13.230      Disposition:  Discharged to home.    Discharge Medications:  New Prescriptions    LORAZEPAM (ATIVAN) 0.5 MG TABLET    Take 1-2 tablets (0.5-1 mg) by mouth every 8 hours as needed for anxiety     Armando Winkler  12/1/2018   [facility] EMERGENCY DEPARTMENT    Scribe Disclosure:  Armando ESTRADA Chi, am serving as a scribe at 10:35 AM on 12/1/2018 to document services personally performed by  Alan Pabon,  based on my observations and the provider's statements to me.        Alan Pabon, DO  12/01/18 9012

## 2018-12-01 NOTE — ED AVS SNAPSHOT
[facility] Emergency Department    [address]    [facility] [address]    Phone:  [phone]    Fax:  [phone]                                       Michael Campbell   MRN: [id number]    Department:  [facility] Emergency Department   Date of Visit:  12/1/2018           Patient Information     Date Of Birth          1955        Your diagnoses for this visit were:     Benzodiazepine withdrawal without complication (H)        You were seen by Alan Pabon DO.      Follow-up Information     Follow up with [facility]. Call in 2 days.    Why:  To discuss other treatment options    Contact information:    [address] [facility]  [place] [address]  [phone]          Follow up with [facility] Emergency Department.    Specialty:  EMERGENCY MEDICINE    Why:  If symptoms worsen    Contact information:    [address]  [facility] [address]  [phone]        Discharge Instructions         Benzodiazepine Withdrawal  Benzodiazepine ( benzo ) medicines are used for anxiety, other mood problems, seizures, alcohol withdrawal, and as muscle relaxants. If you have been taking a benzodiazepine for more than a few weeks, your body gets used to having it. When you stop taking the medicine, withdrawal symptoms develop.  Symptoms  The list of possible withdrawal symptoms is very long. Withdrawal can include pain, agitation, restlessness, shakiness, anxiety, headaches, cramps, dizziness, nausea, vomiting, trouble sleeping, and diarrhea, among many others. More severe reactions can include chest pain, trouble breathing, seizures, delusions, hallucinations, high temperatures, and coma.  As the medicine clears from your system, your body readjusts to not having it. This period is called  detox.  Detox usually takes a few weeks. But it may take a few months if you have been taking benzodiazepines for several years. Symptoms will  go away when detox is complete. If you have been taking benzodiazepines for a long time, you will likely need medical help to prevent severe withdrawals. This is accomplished through a closely monitored regimen where you gradually cut down and wean off of benzodiazepines over weeks to possibly months rather than stop them all at once (cold turkey).  Home care  The healthcare provider may prescribe a sedative to help reduce your symptoms. Take this medicine exactly as instructed. Don't take it more often than prescribed. Never take a sedative with alcohol.  General care    You will need good nutrition during detox. Eat 3 meals a day. Take vitamin and mineral supplements as directed.    Don't drink alcohol during your detox.    If you can, stay with family or friends who can help and support you as you detox.    Don't drive until all symptoms are gone and you are feeling better.  Follow-up care  Follow up with your healthcare provider, or as advised. Withdrawal can last from a few weeks to a few months. Once you get past it, you will feel better. If you were misusing benzodiazepines, it is essential that you get support and treatment to stay off them. Your healthcare provider can help. Here are some resources for support:    Narcotics Anonymous: www.na.org (or check the phone book)    National [place] on Alcoholism and Drug Dependence: 674.273.9258, www.ncadd.org  You may benefit from a residential detox program. There, you can stay overnight and get supervised attention and help. Look online or in the phone book for listings under Drug Abuse and Treatment Centers.  When to seek medical advice  Call your healthcare provider right away if any of these occur:    Severe shakiness    Increasing upper abdominal pain or vomiting    Hallucinations    Headache or confusion    Severe trouble sleeping    Depression    Feeling that you want to harm yourself or others  Call 911  Call 911 if any of these occur:    Trouble  breathing or swallowing, or wheezing    Severe confusion    Extreme drowsiness or trouble awakening    Fainting or loss of consciousness    Rapid heart rate    Very low or very high blood pressure    Seizure  Date Last Reviewed: 3/1/2017    0530-0715 [employer]. [address] [place] [address], [place] [address]. All rights reserved. This information is not intended as a substitute for professional medical care. Always follow your healthcare professional's instructions.          Your next 10 appointments already scheduled     Dec 06, 2018  1:45 PM CST   Office Visit with Katie Echevarria MD   [facility]    [address] [place] [address]  [address]  [facility] [address] [phone]           Bring a current list of meds and any records pertaining to this visit. For Physicals, please bring immunization records and any forms needing to be filled out. Please arrive 10 minutes early to complete paperwork.              24 Hour Appointment Hotline       To make an appointment at any [facility], call [phone] ([phone]). If you don't have a family doctor or clinic, we will help you find one. [facility] are conveniently located to serve the needs of you and your family.             Review of your medicines      CONTINUE these medicines which may have CHANGED, or have new prescriptions. If we are uncertain of the size of tablets/capsules you have at home, strength may be listed as something that might have changed.        Dose / Directions Last dose taken    LORazepam 0.5 MG tablet   Commonly known as:  ATIVAN   Dose:  0.5-1 mg   What changed:    - medication strength  - how much to take  - when to take this  - reasons to take this   Quantity:  15 tablet        Take 1-2 tablets (0.5-1 mg) by mouth every 8 hours as needed for anxiety   Refills:  0          Our records show that you are taking the medicines listed below. If these are incorrect, please call  your family doctor or clinic.        Dose / Directions Last dose taken    traZODone 100 MG tablet   Commonly known as:  DESYREL   Dose:  200 mg   Quantity:  10 tablet        Take 2 tablets by mouth At Bedtime.   Refills:  0                Prescriptions were sent or printed at these locations (1 Prescription)                   Other Prescriptions                Printed at Department/Unit printer (1 of 1)         LORazepam (ATIVAN) 0.5 MG tablet                Orders Needing Specimen Collection     None      Pending Results     No orders found from 11/29/2018 to 12/2/2018.            Pending Culture Results     No orders found from 11/29/2018 to 12/2/2018.            Pending Results Instructions     If you had any lab results that were not finalized at the time of your Discharge, you can call the ED Lab Result RN at [phone]. You will be contacted by this team for any positive Lab results or changes in treatment. The nurses are available 7 days a week from 10A to 6:30P.  You can leave a message 24 hours per day and they will return your call.        Test Results From Your Hospital Stay               Clinical Quality Measure: Blood Pressure Screening     Your blood pressure was checked while you were in the emergency department today. The last reading we obtained was  BP: (!) 160/109 . Please read the guidelines below about what these numbers mean and what you should do about them.  If your systolic blood pressure (the top number) is less than 120 and your diastolic blood pressure (the bottom number) is less than 80, then your blood pressure is normal. There is nothing more that you need to do about it.  If your systolic blood pressure (the top number) is 120-139 or your diastolic blood pressure (the bottom number) is 80-89, your blood pressure may be higher than it should be. You should have your blood pressure rechecked within a year by a primary care provider.  If your systolic blood pressure (the top number) is  "140 or greater or your diastolic blood pressure (the bottom number) is 90 or greater, you may have high blood pressure. High blood pressure is treatable, but if left untreated over time it can put you at risk for heart attack, stroke, or kidney failure. You should have your blood pressure rechecked by a primary care provider within the next 4 weeks.  If your provider in the emergency department today gave you specific instructions to follow-up with your doctor or provider even sooner than that, you should follow that instruction and not wait for up to 4 weeks for your follow-up visit.        Thank you for choosing [facility]       Thank you for choosing [facility] for your care. Our goal is always to provide you with excellent care. Hearing back from our patients is one way we can continue to improve our services. Please take a few minutes to complete the written survey that you may receive in the mail after you visit with us. Thank you!        [employer] Information     [employer] lets you send messages to your doctor, view your test results, renew your prescriptions, schedule appointments and more. To sign up, go to [email or web address] . Click on \"Log in\" on the left side of the screen, which will take you to the Welcome page. Then click on \"Sign up Now\" on the right side of the page.     You will be asked to enter the access code listed below, as well as some personal information. Please follow the directions to create your username and password.     Your access code is: [email or web address]  Expires: 3/1/2019 11:34 AM     Your access code will  in 90 days. If you need help or a new code, please call your [facility] clinic or [phone].        Care EveryWhere ID     This is your Care EveryWhere ID. This could be used by other organizations to access your [facility] medical records  [id number]        Equal Access to Services     JOSEPH FRENCH AH: justin Renae qaybta kaalmada adeegyada, " paty young'aan ah. So [facility] [phone].    ATENCIÓN: Si habla español, tiene a rehman disposición servicios gratuitos de asistencia lingüística. Llame al [phone].    We comply with applicable federal civil rights laws and [place] laws. We do not discriminate on the basis of race, color, national origin, age, disability, sex, sexual orientation, or gender identity.            After Visit Summary       This is your record. Keep this with you and show to your community pharmacist(s) and doctor(s) at your next visit.

## 2018-12-01 NOTE — DISCHARGE INSTRUCTIONS
Benzodiazepine Withdrawal  Benzodiazepine ( benzo ) medicines are used for anxiety, other mood problems, seizures, alcohol withdrawal, and as muscle relaxants. If you have been taking a benzodiazepine for more than a few weeks, your body gets used to having it. When you stop taking the medicine, withdrawal symptoms develop.  Symptoms  The list of possible withdrawal symptoms is very long. Withdrawal can include pain, agitation, restlessness, shakiness, anxiety, headaches, cramps, dizziness, nausea, vomiting, trouble sleeping, and diarrhea, among many others. More severe reactions can include chest pain, trouble breathing, seizures, delusions, hallucinations, high temperatures, and coma.  As the medicine clears from your system, your body readjusts to not having it. This period is called  detox.  Detox usually takes a few weeks. But it may take a few months if you have been taking benzodiazepines for several years. Symptoms will go away when detox is complete. If you have been taking benzodiazepines for a long time, you will likely need medical help to prevent severe withdrawals. This is accomplished through a closely monitored regimen where you gradually cut down and wean off of benzodiazepines over weeks to possibly months rather than stop them all at once (cold turkey).  Home care  The healthcare provider may prescribe a sedative to help reduce your symptoms. Take this medicine exactly as instructed. Don't take it more often than prescribed. Never take a sedative with alcohol.  General care    You will need good nutrition during detox. Eat 3 meals a day. Take vitamin and mineral supplements as directed.    Don't drink alcohol during your detox.    If you can, stay with family or friends who can help and support you as you detox.    Don't drive until all symptoms are gone and you are feeling better.  Follow-up care  Follow up with your healthcare provider, or as advised. Withdrawal can last from a few weeks to a  few months. Once you get past it, you will feel better. If you were misusing benzodiazepines, it is essential that you get support and treatment to stay off them. Your healthcare provider can help. Here are some resources for support:    Narcotics Anonymous: www.na.org (or check the phone book)    National [place] on Alcoholism and Drug Dependence: 829.823.2291, www.ncadd.org  You may benefit from a residential detox program. There, you can stay overnight and get supervised attention and help. Look online or in the phone book for listings under Drug Abuse and Treatment Centers.  When to seek medical advice  Call your healthcare provider right away if any of these occur:    Severe shakiness    Increasing upper abdominal pain or vomiting    Hallucinations    Headache or confusion    Severe trouble sleeping    Depression    Feeling that you want to harm yourself or others  Call 911  Call 911 if any of these occur:    Trouble breathing or swallowing, or wheezing    Severe confusion    Extreme drowsiness or trouble awakening    Fainting or loss of consciousness    Rapid heart rate    Very low or very high blood pressure    Seizure  Date Last Reviewed: 3/1/2017    8963-9039 [employer]. [address] [place] [address]. All rights reserved. This information is not intended as a substitute for professional medical care. Always follow your healthcare professional's instructions.

## 2018-12-04 NOTE — ED NOTES
"Received call from patient on 12/4/18. He states that he was seen in this ED and provided with a prescription for lorazepam and that \"I am out now and I don't think they are going to refill it for me when I go to the clinic on Thursday, so can the doctor I saw there give me a refill?\" Explained to patient that he would need to come back to ED to be seen again by another physician to receive a refill.  "

## 2018-12-06 ENCOUNTER — OFFICE VISIT (OUTPATIENT)
Dept: PEDIATRICS | Facility: CLINIC | Age: 63
End: 2018-12-06
Payer: COMMERCIAL

## 2018-12-06 VITALS
OXYGEN SATURATION: 94 % | BODY MASS INDEX: 25.28 KG/M2 | HEART RATE: 109 BPM | WEIGHT: 166.8 LBS | SYSTOLIC BLOOD PRESSURE: 150 MMHG | DIASTOLIC BLOOD PRESSURE: 100 MMHG | TEMPERATURE: 98.3 F | HEIGHT: 68 IN

## 2018-12-06 DIAGNOSIS — F13.20 BENZODIAZEPINE DEPENDENCE (H): ICD-10-CM

## 2018-12-06 DIAGNOSIS — G47.00 INSOMNIA, UNSPECIFIED TYPE: ICD-10-CM

## 2018-12-06 DIAGNOSIS — F41.9 ANXIETY: Primary | ICD-10-CM

## 2018-12-06 PROCEDURE — 99214 OFFICE O/P EST MOD 30 MIN: CPT | Mod: GC | Performed by: STUDENT IN AN ORGANIZED HEALTH CARE EDUCATION/TRAINING PROGRAM

## 2018-12-06 RX ORDER — SERTRALINE HYDROCHLORIDE 25 MG/1
25 TABLET, FILM COATED ORAL DAILY
Qty: 30 TABLET | Refills: 0 | Status: SHIPPED | OUTPATIENT
Start: 2018-12-06 | End: 2019-10-24

## 2018-12-06 RX ORDER — TRAZODONE HYDROCHLORIDE 100 MG/1
100-200 TABLET ORAL AT BEDTIME
Qty: 40 TABLET | Refills: 0 | Status: CANCELLED | OUTPATIENT
Start: 2018-12-06 | End: 2018-12-16

## 2018-12-06 RX ORDER — PROPRANOLOL HYDROCHLORIDE 10 MG/1
10 TABLET ORAL 2 TIMES DAILY
Qty: 30 TABLET | Refills: 1 | Status: SHIPPED | OUTPATIENT
Start: 2018-12-06 | End: 2019-10-24

## 2018-12-06 RX ORDER — LISINOPRIL AND HYDROCHLOROTHIAZIDE 12.5; 2 MG/1; MG/1
1 TABLET ORAL
COMMUNITY
Start: 2018-02-15 | End: 2019-02-15

## 2018-12-06 NOTE — PROGRESS NOTES
"  SUBJECTIVE:   Michael Campbell is a 63 year old male who presents to clinic today for the following health issues:      ED/UC Followup:    Facility: [facility]   Date of visit: 12/1/18  Reason for visit: Benzodiazepine withdrawal  Current Status: same, very fatigue- not able to sleep, weird dreams   Given 15 tablets 0.5 mg at ED  - out currently      Patient reports that he needs a refill of his lorazepam.  Notes he was seen in the emergency department on December 1 for benzodiazepine withdrawal and was prescribed a short course to bridge him to this appointment.  Reports he has been out of the lorazepam for the last few days as he was taking 2 of the 0.5 mg tablets 3 times daily.  Reports he \"needs to wean down on the benzodiazepines but does not want to go through withdrawal.\" Notes he was started on the benzodiazepines for anxiety.  He follows with a psychiatrist who is \"hours away in [place] and does not have an appointment until December 12.\"  Reports he has tried hydroxyzine in the past for his anxiety which made him feel awful but does not think he has tried any other medications.  Notes he has also had trouble sleeping since he ran out of the benzodiazepines.  He has been taking 200 mg of trazodone nightly without much response.  Notes that his trazodone works much better when he is also on the benzodiazepines.  Reports he is feeling anxious throughout the day no one time more than others.  Feels that he has had difficulty concentrating as well.  States he \"feels bad for his dog\" as he has difficulty getting out of the house to walk the dog.    Has not established care with a primary care physician here in the [place] area or a more local psychiatrist.  Discussed the importance of both of these things.  Based on chart review, patient has been followed by Dr. Luis Messina at the behavioral health clinic in [facility] the [facility].  Dr. Messina has been the primary prescriber of the " "patient's benzodiazepines in the past however recent documentation shows that the patient has been running out of his prescriptions too early and requesting refills.  So patient has been utilizing urgent cares and emergency departments for refills since September.  Discussed this with the patient and he says he understands the importance of weaning off benzodiazepines but again does not want to go through withdrawal and requests that we refill his prescription here today.  The patient was offered propranolol to assist with withdrawal symptoms as well as starting low-dose sertraline to assist with management of his underlying anxiety.  He initially refused but then was amendable saying to just prescribe whatever would work.    Problem list and histories reviewed & adjusted, as indicated.  Additional history: as documented    Current Outpatient Prescriptions   Medication Sig Dispense Refill     LORazepam (ATIVAN) 0.5 MG tablet Take 1-2 tablets (0.5-1 mg) by mouth every 8 hours as needed for anxiety 15 tablet 0     propranolol (INDERAL) 10 MG tablet Take 1 tablet (10 mg) by mouth 2 times daily 30 tablet 1     sertraline (ZOLOFT) 25 MG tablet Take 1 tablet (25 mg) by mouth daily 30 tablet 0     TRAZodone (DESYREL) 100 MG tablet Take 2 tablets by mouth At Bedtime. 10 tablet 0     lisinopril-hydrochlorothiazide (PRINZIDE/ZESTORETIC) 20-12.5 MG tablet Take 1 tablet by mouth         Reviewed and updated as needed this visit by clinical staff  Tobacco  Meds  Med Hx  Surg Hx  Fam Hx  Soc Hx      Reviewed and updated as needed this visit by Provider  Tobacco  Meds  Med Hx  Surg Hx  Fam Hx  Soc Hx        ROS:  Constitutional, HEENT, cardiovascular, pulmonary, GI, , musculoskeletal, neuro, skin, endocrine and psych systems are negative, except as otherwise noted.    OBJECTIVE:     BP (!) 150/100  Pulse 109  Temp 98.3  F (36.8  C) (Oral)  Ht 5' 7.5\" (1.715 m)  Wt 166 lb 12.8 oz (75.7 kg)  SpO2 94%  BMI 25.74 " "kg/m2  Body mass index is 25.74 kg/(m^2).  GENERAL: healthy, alert and appears somewhat shaky, stutters occasionally  EYES: Eyes grossly normal to inspection, sclerae normal  NECK:  no asymmetry or scars   RESP: easy respiratory effort on room air  CV: regular rate and rhythm  MS: no gross musculoskeletal defects noted, no edema  NEURO: tremor mild, mentation intact and gait normal   PSYCH: mentation appears normal, anxious and repeats himself \" I know I need to quit; I just don't want to go through withdrawals\"    Diagnostic Test Results:  none     ASSESSMENT/PLAN:     1. Anxiety  2. Benzodiazepine dependence (H)  Patient does appear to have significant anxiety that is complicated by benzodiazepine dependence.  As patient is not currently followed here in clinic we did not feel comfortable prescribing his benzodiazepine as only one provider should be in control of that.  This was discussed with the patient and he was advised to keep his appointment on December 12 with Dr. Messina.  We offered placing a mental health referral so that he could find a primary mental health provider closer to his home and [place].  As well as medications in an attempt to help with underlying anxiety as well as hopefully assist with symptoms of the benzodiazepine withdrawal.  The patient accepted these recommendations however seemed dissatisfied that we were unable to prescribe his lorazepam.  - MENTAL HEALTH REFERRAL  - Adult; Psychiatry and Medication Management; [place] [facility] [phone].  Medication management & future refills will be returned to [facility] [place] upon completion of evaluation; We lyndsey...  - propranolol (INDERAL) 10 MG tablet; Take 1 tablet (10 mg) by mouth 2 times daily  Dispense: 30 tablet; Refill: 1  - sertraline (ZOLOFT) 25 MG tablet; Take 1 tablet (25 mg) by mouth daily  Dispense: 30 tablet; Refill: 0    3. Insomnia, unspecified type  Patient notes he has plenty of trazodone. Stated " he could continue to take this as needed.     Follow up with psychiatrist in [place] on 12/12/18 and establish care in [place] as above.     Katie Echevarria MD  Internal Medicine & Pediatrics PGY2  [facility]  I have seen this patient and examined him in the presence of Dr. Echevarria.  I was present during the key components of the presenting complaints, physical exam, diagnosis, and plan, and fully concur with the plan as listed in the resident's note.    Wallace Trujillo MD  Internal Medicine and Pediatrics

## 2019-07-14 ENCOUNTER — TRANSFERRED RECORDS (OUTPATIENT)
Dept: HEALTH INFORMATION MANAGEMENT | Facility: CLINIC | Age: 64
End: 2019-07-14

## 2019-07-23 ENCOUNTER — NURSE TRIAGE (OUTPATIENT)
Dept: NURSING | Facility: CLINIC | Age: 64
End: 2019-07-23

## 2019-07-23 NOTE — TELEPHONE ENCOUNTER
Patient calling. States he had a seizure last week and is now uncertain how to proceed.     States he was out of town last week when he had his seizure. [place] but doesn't feel he will assist him with this issue. He feels he needs to go to treatment.     States he completed treatment at [facility] a few years ago but he did not like the facility and can not go back there.    No acute symptoms at this time. Encouraged patient to make appointment and discuss concerns with provider.    Protocol and care advice reviewed  Caller states understanding of the recommended disposition. Transferred to scheduling for appointment in [place].    Advised to call back if further questions or concerns      Reason for Disposition    [1] Caller requesting NON-URGENT health information AND [2] PCP's office is the best resource    Additional Information    Negative: RN needs further essential information from caller in order to complete triage    Negative: Requesting regular office appointment    Protocols used: INFORMATION ONLY CALL-A-

## 2019-09-02 ENCOUNTER — HOSPITAL ENCOUNTER (EMERGENCY)
Facility: CLINIC | Age: 64
Discharge: HOME | End: 2019-09-02
Attending: EMERGENCY MEDICINE | Admitting: EMERGENCY MEDICINE
Payer: COMMERCIAL

## 2019-09-02 VITALS
TEMPERATURE: 97.9 F | WEIGHT: 163 LBS | SYSTOLIC BLOOD PRESSURE: 150 MMHG | HEIGHT: 68 IN | RESPIRATION RATE: 16 BRPM | OXYGEN SATURATION: 99 % | DIASTOLIC BLOOD PRESSURE: 103 MMHG | BODY MASS INDEX: 24.71 KG/M2

## 2019-09-02 DIAGNOSIS — F41.9 ANXIETY: ICD-10-CM

## 2019-09-02 PROCEDURE — 99283 EMERGENCY DEPT VISIT LOW MDM: CPT

## 2019-09-02 ASSESSMENT — ENCOUNTER SYMPTOMS
UNEXPECTED WEIGHT CHANGE: 1
DIAPHORESIS: 1
APPETITE CHANGE: 0
SLEEP DISTURBANCE: 1
NERVOUS/ANXIOUS: 1
DECREASED CONCENTRATION: 1

## 2019-09-02 ASSESSMENT — MIFFLIN-ST. JEOR: SCORE: 1498.86

## 2019-09-02 NOTE — ED TRIAGE NOTES
Ran out of Ativan a couple weeks ago and feels like he is withdrawing from the medication.  Loss of weight, anxious, awake in the middle of night restless and cold sweats,.

## 2019-09-02 NOTE — ED PROVIDER NOTES
"  History     Chief Complaint:  Medication withdrawl      HPI   Michael Campbell is a 63 year old male who presents with weight loss, anxiety, insomnia and cold sweats for the past couple of weeks. From chart review the psychiatrist noted the patient violated controlled substance use agreement and would not longer receive benzo's.  He last refilled benzo's June 2019.     Patient hasn't been able to focus and will frequently wake up at 0300. He lost about ten pounds in one month. He reports that he ran out of his Ativan a couple of weeks ago (this is not supported by my chart review) and is concerned this is a withdrawal. Patient notes he takes this for anxiety and he has been taking if for \"years and years.\" He is supposed to be find a new psychiatrist soon since he stopped seeing his psychiatrist in [place]. Patient was admitted to the hospital on 7/14/19 for a seizure. They were concerned at the time that he had an alcohol or benzodiazepine withdrawal seizure. Last refill of benzodiazepines was in June. He says he drinks a few beers \"every now and then\" and does not drink hard alcohol. The last time he saw a primary care provider was 8/16/19 and he was given hydroxyzine (they refused to give him benzo's given his history), however he states he had high blood pressure on this and it didn't work for him. No other change in his health recently. He is eating and working out regularly. Patient denies suicidal ideation.     Allergies:  Codeine  Sudafed  Vicodin     Medications:    Desyrel  Propranolol   Zoloft     Past Medical History:    Alcoholism   Anxiety   Insomnia  Substance abuse    Past Surgical History:    History reviewed. No pertinent past surgical history.    Family History:    Alzheimier's disease  RA    Social History:  Presents to the ED by himself.   Tobacco Use: Light tobacco smoker  Alcohol Use: A few beers every now and then  PCP: Sunny Lakhani  Marital Status:  Single [1]   Patient recently " "moved to [place] from [place].     Review of Systems   Constitutional: Positive for diaphoresis and unexpected weight change. Negative for appetite change.   Psychiatric/Behavioral: Positive for decreased concentration and sleep disturbance. Negative for suicidal ideas. The patient is nervous/anxious.    All other systems reviewed and are negative.      Physical Exam   First Vitals:  BP: (!) 150/103  Heart Rate: 97  Temp: 97.9  F (36.6  C)  Resp: 16  Height: 172.7 cm (5' 8\")  Weight: 73.9 kg (163 lb)  SpO2: 99 %    Physical Exam  General: Patient is alert and interactive when I enter the room  Head:  The scalp, face, and head appear normal  Eyes:  The pupils are equal, round, and reactive to light    Conjunctivae and sclerae are normal  ENT:    External acoustic canals are normal    The oropharynx is normal without erythema.     Uvula is in the midline  Neck:  Normal range of motion  CV:  Regular rate. S1/S2. No murmurs.   Resp:  Lungs are clear without wheezes or rales. No distress  GI:  Abdomen is soft, no rigidity, guarding, or rebound    No distension. No tenderness to palpation in any quadrant.     MS:  Normal tone. Joints grossly normal without effusions.     No asymmetric leg swelling, calf or thigh tenderness.      Normal motor assessment of all extremities.  Skin:  No rash or lesions noted. Normal capillary refill noted  Neuro: Speech is normal and fluent. Face is symmetric.     Moving all extremities well.   Psych:  Awake. Alert.  Anxious. Normal affect.  Appropriate interactions.  Lymph: No anterior cervical lymphadenopathy noted        Emergency Department Course     Emergency Department Course:  The patient arrived in triage where vitals were measured and recorded.   The patient was then escorted back to the emergency department.   The patient's medical records were reviewed.  Nursing notes and vitals were reviewed.  1333: I performed an exam of the patient as documented above.  The above workup was " undertaken.    Findings and plan explained to the Patient. Patient discharged home, status improved, with instructions regarding supportive care, medications, and reasons to return as well as the importance of close follow-up was reviewed.     Impression & Plan      Medical Decision Making:  Michael Cambpell is a 63 year old male who presents for evaluation of anxiety.  There is a history of anxiety in the past and they are not on medications (off benzo's now, was prescribed prozac and buspar in the past but is not taking).       There is no signs at this point of a general medical problem causing anxiety (PE, hyperthyroidism, other metabolic derangement, infection, etc).  There are no signs of serious decompensation warranting psychiatric hospitalization or 72 hold (no suicidal or homicidal ideation, no danger to self).  Supportive outpatient management is therefore indicated.      He is upset that I would not prescribe him controlled substances. He left after my full evaluation, swearing on his way out and after I told him he is being sent home without ativan. I offered him DEC evaluation and he refused.       Diagnosis:    ICD-10-CM    1. Anxiety F41.9        Disposition:  Discharged to home.       I, Chanel You, am serving as a scribe on 9/2/2019 at 1:33 PM to personally document services performed by Dr. Gimenez based on my observations and the provider's statements to me.   [facility] EMERGENCY DEPARTMENT       Alex Gimenez MD  09/02/19 1471

## 2019-10-24 ENCOUNTER — ANCILLARY PROCEDURE (OUTPATIENT)
Dept: GENERAL RADIOLOGY | Facility: CLINIC | Age: 64
End: 2019-10-24
Attending: FAMILY MEDICINE
Payer: COMMERCIAL

## 2019-10-24 ENCOUNTER — OFFICE VISIT (OUTPATIENT)
Dept: PEDIATRICS | Facility: CLINIC | Age: 64
End: 2019-10-24
Payer: COMMERCIAL

## 2019-10-24 VITALS
TEMPERATURE: 97.8 F | HEIGHT: 68 IN | RESPIRATION RATE: 16 BRPM | SYSTOLIC BLOOD PRESSURE: 128 MMHG | WEIGHT: 164 LBS | DIASTOLIC BLOOD PRESSURE: 70 MMHG | BODY MASS INDEX: 24.86 KG/M2 | OXYGEN SATURATION: 97 % | HEART RATE: 71 BPM

## 2019-10-24 DIAGNOSIS — R63.4 WEIGHT LOSS: ICD-10-CM

## 2019-10-24 DIAGNOSIS — R61 NIGHT SWEATS: ICD-10-CM

## 2019-10-24 DIAGNOSIS — R53.81 MALAISE: ICD-10-CM

## 2019-10-24 DIAGNOSIS — R63.4 WEIGHT LOSS: Primary | ICD-10-CM

## 2019-10-24 LAB
ALBUMIN UR-MCNC: NEGATIVE MG/DL
APPEARANCE UR: CLEAR
BASOPHILS # BLD AUTO: 0 10E9/L (ref 0–0.2)
BASOPHILS NFR BLD AUTO: 0.6 %
BILIRUB UR QL STRIP: NEGATIVE
COLOR UR AUTO: YELLOW
DIFFERENTIAL METHOD BLD: ABNORMAL
EOSINOPHIL # BLD AUTO: 0.2 10E9/L (ref 0–0.7)
EOSINOPHIL NFR BLD AUTO: 3.3 %
ERYTHROCYTE [DISTWIDTH] IN BLOOD BY AUTOMATED COUNT: 11.4 % (ref 10–15)
ERYTHROCYTE [SEDIMENTATION RATE] IN BLOOD BY WESTERGREN METHOD: 12 MM/H (ref 0–20)
GLUCOSE UR STRIP-MCNC: NEGATIVE MG/DL
HCT VFR BLD AUTO: 41.7 % (ref 40–53)
HGB BLD-MCNC: 13.7 G/DL (ref 13.3–17.7)
HGB UR QL STRIP: NEGATIVE
KETONES UR STRIP-MCNC: NEGATIVE MG/DL
LEUKOCYTE ESTERASE UR QL STRIP: NEGATIVE
LYMPHOCYTES # BLD AUTO: 1.8 10E9/L (ref 0.8–5.3)
LYMPHOCYTES NFR BLD AUTO: 26.1 %
MCH RBC QN AUTO: 32 PG (ref 26.5–33)
MCHC RBC AUTO-ENTMCNC: 32.9 G/DL (ref 31.5–36.5)
MCV RBC AUTO: 97 FL (ref 78–100)
MONOCYTES # BLD AUTO: 1 10E9/L (ref 0–1.3)
MONOCYTES NFR BLD AUTO: 13.8 %
NEUTROPHILS # BLD AUTO: 3.9 10E9/L (ref 1.6–8.3)
NEUTROPHILS NFR BLD AUTO: 56.2 %
NITRATE UR QL: NEGATIVE
PH UR STRIP: 7 PH (ref 5–7)
PLATELET # BLD AUTO: 304 10E9/L (ref 150–450)
RBC # BLD AUTO: 4.28 10E12/L (ref 4.4–5.9)
RBC #/AREA URNS AUTO: NORMAL /HPF
SOURCE: NORMAL
SP GR UR STRIP: 1.02 (ref 1–1.03)
UROBILINOGEN UR STRIP-ACNC: 0.2 EU/DL (ref 0.2–1)
WBC # BLD AUTO: 7 10E9/L (ref 4–11)
WBC #/AREA URNS AUTO: NORMAL /HPF

## 2019-10-24 PROCEDURE — 80053 COMPREHEN METABOLIC PANEL: CPT | Performed by: FAMILY MEDICINE

## 2019-10-24 PROCEDURE — 99203 OFFICE O/P NEW LOW 30 MIN: CPT | Performed by: FAMILY MEDICINE

## 2019-10-24 PROCEDURE — 85025 COMPLETE CBC W/AUTO DIFF WBC: CPT | Performed by: FAMILY MEDICINE

## 2019-10-24 PROCEDURE — 86666 EHRLICHIA ANTIBODY: CPT | Mod: 90 | Performed by: FAMILY MEDICINE

## 2019-10-24 PROCEDURE — 36415 COLL VENOUS BLD VENIPUNCTURE: CPT | Performed by: FAMILY MEDICINE

## 2019-10-24 PROCEDURE — 85652 RBC SED RATE AUTOMATED: CPT | Performed by: FAMILY MEDICINE

## 2019-10-24 PROCEDURE — 81001 URINALYSIS AUTO W/SCOPE: CPT | Performed by: FAMILY MEDICINE

## 2019-10-24 PROCEDURE — 84443 ASSAY THYROID STIM HORMONE: CPT | Performed by: FAMILY MEDICINE

## 2019-10-24 PROCEDURE — 86140 C-REACTIVE PROTEIN: CPT | Performed by: FAMILY MEDICINE

## 2019-10-24 PROCEDURE — 86618 LYME DISEASE ANTIBODY: CPT | Performed by: FAMILY MEDICINE

## 2019-10-24 PROCEDURE — 71046 X-RAY EXAM CHEST 2 VIEWS: CPT

## 2019-10-24 PROCEDURE — 99000 SPECIMEN HANDLING OFFICE-LAB: CPT | Performed by: FAMILY MEDICINE

## 2019-10-24 PROCEDURE — 84439 ASSAY OF FREE THYROXINE: CPT | Performed by: FAMILY MEDICINE

## 2019-10-24 ASSESSMENT — MIFFLIN-ST. JEOR: SCORE: 1495.46

## 2019-10-25 ENCOUNTER — TELEPHONE (OUTPATIENT)
Dept: FAMILY MEDICINE | Facility: CLINIC | Age: 64
End: 2019-10-25

## 2019-10-25 LAB
ALBUMIN SERPL-MCNC: 3.9 G/DL (ref 3.4–5)
ALP SERPL-CCNC: 66 U/L (ref 40–150)
ALT SERPL W P-5'-P-CCNC: 24 U/L (ref 0–70)
ANION GAP SERPL CALCULATED.3IONS-SCNC: 7 MMOL/L (ref 3–14)
AST SERPL W P-5'-P-CCNC: 22 U/L (ref 0–45)
BILIRUB SERPL-MCNC: 0.3 MG/DL (ref 0.2–1.3)
BUN SERPL-MCNC: 17 MG/DL (ref 7–30)
CALCIUM SERPL-MCNC: 9 MG/DL (ref 8.5–10.1)
CHLORIDE SERPL-SCNC: 102 MMOL/L (ref 94–109)
CO2 SERPL-SCNC: 27 MMOL/L (ref 20–32)
CREAT SERPL-MCNC: 1.27 MG/DL (ref 0.66–1.25)
CRP SERPL-MCNC: <2.9 MG/L (ref 0–8)
GFR SERPL CREATININE-BSD FRML MDRD: 59 ML/MIN/{1.73_M2}
GLUCOSE SERPL-MCNC: 81 MG/DL (ref 70–99)
POTASSIUM SERPL-SCNC: 4.6 MMOL/L (ref 3.4–5.3)
PROT SERPL-MCNC: 7.5 G/DL (ref 6.8–8.8)
SODIUM SERPL-SCNC: 136 MMOL/L (ref 133–144)
T4 FREE SERPL-MCNC: 0.98 NG/DL (ref 0.76–1.46)
TSH SERPL DL<=0.005 MIU/L-ACNC: 4.79 MU/L (ref 0.4–4)

## 2019-10-25 NOTE — PROGRESS NOTES
CHIEF COMPLAINT    Concerns about weight loss, body aches, fatigue.      HISTORY    Michael is a 63-year-old man who comes in with concerns of losing 15 pounds in the last 2 months.  He says he was 175 pounds at one time.  He also has been excessively tired, often going to bed at 6:30 PM and then waking up at 1:30 AM.  He says he wakes up with sweats in the nighttime.  He is not noticing fever.  He additionally complains of generalized body aches.    He otherwise has not been specifically ill with any distinct symptoms.    Review of his history indicates that he has a history of anxiety.  He used to be on clonazepam and Ativan but says he has been off these for 6 or 7 months.  There is mention of benzodiazepine dependence in his chart.    He states that his psychiatric care has been through a doctor named Luis etienne who practices up in [facility] but it sounds like he has not seen him recently.    Presently he takes trazodone at night.  He is a bit unclear of the exact dose but it may be 200 or 300 mg.    As far as alcohol intake, patient states that he only drinks an occasional light beer and does not drink every day.    He does state that he eats regular meals.    Patient does smoke cigarettes.  He is a little vague on how much.  Chart indicates one half PPD.    Patient Active Problem List   Diagnosis     Anxiety     Insomnia     Hypertension     Benzodiazepine dependence (H)     Chemical dependency (H)     Controlled substance agreement broken     Generalized anxiety disorder       Current Outpatient Medications   Medication Sig Dispense Refill     TRAZodone (DESYREL) 100 MG tablet Take 2 tablets by mouth At Bedtime. 10 tablet 0         REVIEW OF SYSTEMS    No fever.  No cough or S OB.  No hemoptysis.  No chest pain.  No palpitations.  No peripheral edema.  No abdominal pain or nausea.  No diarrhea.  No urinary difficulty.  No localized numbness or weakness.  No rashes.      Past Medical History:  "  Diagnosis Date     Alcoholism (H)      Anxiety      Substance abuse (H)        EXAM  /70 (BP Location: Right arm, Patient Position: Chair, Cuff Size: Adult Regular)   Pulse 71   Temp 97.8  F (36.6  C) (Tympanic)   Resp 16   Ht 1.715 m (5' 7.5\")   Wt 74.4 kg (164 lb)   SpO2 97%   BMI 25.31 kg/m        Patient in general appears to be of normal weight.  He is alert.  Looking at the weight chart, he has dropped about 9 pounds in the last year.  HEENT.  No scleral icterus.  Pharynx normal.  Neck no thyromegaly or mass.  Chest few basilar rales, no wheezes, no consolidation.  Cardiac no murmurs, RSR.  Abdomen nontender, no masses, nondistended.  Extremities no edema.  Speech, motor, gait.  Normal.      (R63.4) Weight loss  (primary encounter diagnosis)  Comment:   Plan: CBC with platelets and differential, CRP,         inflammation, ESR: Erythrocyte sedimentation         rate, Lyme Disease Maggie with reflex to WB Serum,        Comprehensive metabolic panel (BMP + Alb, Alk         Phos, ALT, AST, Total. Bili, TP), TSH with free        T4 reflex, XR Chest 2 Views            (R61) Night sweats  Comment:   Plan: CBC with platelets and differential, Lyme         Disease Maggie with reflex to WB Serum,         Comprehensive metabolic panel (BMP + Alb, Alk         Phos, ALT, AST, Total. Bili, TP), XR Chest 2         Views, UA with Microscopic reflex to Culture            (R53.81) Malaise  Comment:   Plan: Lyme Disease Maggie with reflex to WB Serum,         Anaplasma phagocytoph antibody IgG IgM, XR         Chest 2 Views, UA with Microscopic reflex to         Culture              Discussion:  Symptoms documented as above.  It is not clear what might be causing this.  It does not appear that he has lost quite as much weight as he describes.  Lab work and x-ray were provided.  Depending on findings, he may need additional work.  Patient has seen Dr. Gallagher before and I did suggest he consider following up with " him.

## 2019-10-25 NOTE — TELEPHONE ENCOUNTER
"Called the pt.    Informed pt of routing note from Dr Lobato:  \"You may call him back.     Tick born illness tests not back yet.     O/W blood counts, chemistries, thyroid, CXR, urine not suspicious for serious condition.\"    Informed pt that they may call back for the lymes disease test after it has been resulted.    - Jaya \"Marquez\" PAPI Richardson  [facility]    "

## 2019-10-25 NOTE — TELEPHONE ENCOUNTER
Reason for call:  Results   Name of test or procedure: Labs  Date of test or procedure: 10/24/59694  Location of test or procedure[facility]    Additional comments: Pt would like a call back asap with lab results     Phone number to reach patient:  Home number on file [phone] (home)    Best Time:  any    Can we leave a detailed message on this number?  YES     Vannesa Stacy on 10/25/2019 at 3:25 PM

## 2019-10-25 NOTE — TELEPHONE ENCOUNTER
"Lab results have not been review yet.    Routing to Dr Lobato for review.    - Jaya \"Marquez\" PAPI Richardson  [facility]    "

## 2019-10-26 LAB
A PHAGOCYTOPH IGG TITR SER IF: NORMAL {TITER}
A PHAGOCYTOPH IGM TITR SER IF: NORMAL {TITER}

## 2019-10-28 ENCOUNTER — TELEPHONE (OUTPATIENT)
Dept: PEDIATRICS | Facility: CLINIC | Age: 64
End: 2019-10-28

## 2019-10-28 LAB — B BURGDOR IGG+IGM SER QL: 0.07 (ref 0–0.89)

## 2019-10-28 NOTE — TELEPHONE ENCOUNTER
"Pt called back seeking results to recent labs and XR (form 10/24/19).    Dr Lobato was the pt's provider during visit. Dr Lobato will not be in the visit for a while.    Routing request to review labs/XR to larger provider pool, as pt does not have a PCP.    Thank you to whoever is able to assist.    - Jaya \"Marquez\" PAPI Richardson  Triage [facility]    "

## 2019-10-28 NOTE — TELEPHONE ENCOUNTER
Pt called and is very angry. No one has called him for his results. No  Has read them yet. Said he was on hold for 45 min. Record shows he was on hold for 13 min. He is now going to call his  and have a lw suit against us.

## 2019-10-28 NOTE — TELEPHONE ENCOUNTER
Unsure who to route to per p    Reason for call:  Results     Name of test or procedure: labs and chest xray    Additional comments: pt req a cb with his results    Phone number to reach patient:  Cell number on file:    Telephone Information:   Mobile [phone]       Best Time:  any    Can we leave a detailed message on this number?  YES

## 2019-10-29 NOTE — TELEPHONE ENCOUNTER
Left detailed message. Relayed provider note below. Let patient know can call back with any questions or to schedule a f/u appointment.  Elly GARCIA RN, BSN

## 2019-11-05 ENCOUNTER — TRANSFERRED RECORDS (OUTPATIENT)
Dept: HEALTH INFORMATION MANAGEMENT | Facility: CLINIC | Age: 64
End: 2019-11-05

## 2019-11-05 ENCOUNTER — TELEPHONE (OUTPATIENT)
Dept: PEDIATRICS | Facility: CLINIC | Age: 64
End: 2019-11-05

## 2019-11-05 NOTE — TELEPHONE ENCOUNTER
Wt Readings from Last 2 Encounters:   10/24/19 74.4 kg (164 lb)   09/02/19 73.9 kg (163 lb)     Spoke to patient. He is complaining achy and loss of weight. Denies fever. Patient still very tired and going to be around 6:30pm. Denies diet changes and medication changes.    Patient has been feeling this way for the last 3-4 months.  Patient said he current weight is down to 158lbs. Patient said he is still having the same symptoms he was having last time he was seen.    Patient is requesting to be seen. Transferred patient to  per patient request to be seen. Patient scheduled for 11/12/19 at 1:40pm.  Elly GARCIA RN, BSN

## 2019-11-05 NOTE — TELEPHONE ENCOUNTER
Reason for call:  Other   Patient called regarding (reason for call): call back  Additional comments: Pt calling to schedule appt. Pt is very frustrated with the symptoms. Pt was recommended by Dr. Lobato to meet with Dr. Gallagher. This writer told pt Dr. Gallagher practice was closed and there were no appt open soon with him. Pt become very angry and verbally aggressive.     This writer tried to transfer call to RN to assist pt with ongoing symptoms but pt started raise his voice telling he didn't want to be put on hold and couldn't listen to me, call got disconnected or pt hung up. Not sure.      Phone number to reach patient:  Home number on file [phone] (home)    Best Time:  ANY TIME    Can we leave a detailed message on this number?  YES

## 2019-11-06 ENCOUNTER — TELEPHONE (OUTPATIENT)
Dept: PEDIATRICS | Facility: CLINIC | Age: 64
End: 2019-11-06

## 2019-11-06 NOTE — TELEPHONE ENCOUNTER
This patient should be seen by one of the PCPs for these concerns, residents would be fine as well.  LINA Acosta, CNP

## 2019-11-07 NOTE — TELEPHONE ENCOUNTER
Dr Gallagher, wondering if you would be able to follow up with this pt in Dr Cameron's absence? It looks like you may have reviewed his chart possibly and that Dr cameron recommended that he follow up with you. Please advise further on how we should schedule this pt please. ~Thank You Kindly      Karolyn Guillory,     on 11/7/2019 at 10:21 AM

## 2019-11-07 NOTE — TELEPHONE ENCOUNTER
I saw this pt only once in 2011. He could be seen by any provider including residents since my next available is in January.

## 2019-11-08 NOTE — TELEPHONE ENCOUNTER
1st attempt: left VM message to return call. Please assist pt in rescheduling an appt with residents. Next available is Nov. 27.   Kadi Hwang MA 4:39 PM 11/8/2019

## 2019-11-11 NOTE — TELEPHONE ENCOUNTER
2nd attempt: left VM message regarding upcoming appt. Please assist pt in rescheduling appt with residents. Next available opening: Nov. 13. See below.  Kadi Hwang MA 2:47 PM 11/11/2019

## 2020-01-03 ENCOUNTER — NURSE TRIAGE (OUTPATIENT)
Dept: PEDIATRICS | Facility: CLINIC | Age: 65
End: 2020-01-03

## 2020-01-03 NOTE — TELEPHONE ENCOUNTER
"Reason for Call:  Other call back    Detailed comments: Patient is calling wanting an ASAP appointment for depression.  Patient wants to see someone \"experienced.\"  Patient last saw Dr. Lobato who does not have availability soon.  Please contact patient for scheduling advisement.     Phone Number Patient can be reached at: Home number on file [phone] (home)    Best Time: any    Can we leave a detailed message on this number? YES    Call taken on 1/3/2020 at 8:50 AM by Vangie Schwartz      "

## 2020-01-06 ASSESSMENT — ANXIETY QUESTIONNAIRES
7. FEELING AFRAID AS IF SOMETHING AWFUL MIGHT HAPPEN: NOT AT ALL
3. WORRYING TOO MUCH ABOUT DIFFERENT THINGS: SEVERAL DAYS
5. BEING SO RESTLESS THAT IT IS HARD TO SIT STILL: SEVERAL DAYS
1. FEELING NERVOUS, ANXIOUS, OR ON EDGE: SEVERAL DAYS
GAD7 TOTAL SCORE: 7
IF YOU CHECKED OFF ANY PROBLEMS ON THIS QUESTIONNAIRE, HOW DIFFICULT HAVE THESE PROBLEMS MADE IT FOR YOU TO DO YOUR WORK, TAKE CARE OF THINGS AT HOME, OR GET ALONG WITH OTHER PEOPLE: SOMEWHAT DIFFICULT
2. NOT BEING ABLE TO STOP OR CONTROL WORRYING: MORE THAN HALF THE DAYS
6. BECOMING EASILY ANNOYED OR IRRITABLE: SEVERAL DAYS

## 2020-01-06 ASSESSMENT — PATIENT HEALTH QUESTIONNAIRE - PHQ9
SUM OF ALL RESPONSES TO PHQ QUESTIONS 1-9: 10
5. POOR APPETITE OR OVEREATING: SEVERAL DAYS

## 2020-01-06 NOTE — TELEPHONE ENCOUNTER
Triage, Please call pt to address for symptoms and advise further, Thanks!      Karolyn Guillory,     on 1/6/2020 at 9:08 AM

## 2020-01-06 NOTE — TELEPHONE ENCOUNTER
"Patient very irritable and short tempered during phone call.     Continued to state \"come on.\"     Patient stated he wants to see someone with experience.     Phq9 and GAD7 filled out at this time.     PHQ9- 10   GAD7-7    Patient is not having suicidal/homicidal thoughts.     Wants to be on Prozac if possible.     Patient knows he can not be on the \"benzos\"    Patient has had many [facility] behavioral health visits in the past year.     Would you prefer he be seen by them again?    Additional Information    Patient wants to be seen    Answer Assessment - Initial Assessment Questions  1. CONCERN: \"What happened that made you call today?\"      I was on anxiety medications and the former doctor cut me off of them.   So now i'm having more problems. I would like to get on Prozac or something. Not Ativan/ Klonopin. Last July.   2. DEPRESSION SYMPTOM SCREENING: \"How are you feeling overall?\" (e.g., decreased energy, increased sleeping or difficulty sleeping, difficulty concentrating, feelings of sadness, guilt, hopelessness, or worthlessness)      I'm sleeping more. Sometimes I feel good and sometimes depression/anxiey.   3. Risk OF HARM - SUICIDAL IDEATION:  \"Do you ever have thoughts of hurting or killing yourself?\"  (e.g., yes, no, no but preoccupation with thoughts about death)  No     - INTENT:  \"Do you have thoughts of hurting or killing yourself right NOW?\" (e.g., yes, no, N/A)    - PLAN: \"Do you have a specific plan for how you would do this?\" (e.g., gun, knife, overdose, no plan, N/A)      *No Answer*  4. RISK OF HARM - HOMICIDAL IDEATION:  \"Do you ever have thoughts of hurting or killing someone else?\"  (e.g., yes, no, no but preoccupation with thoughts about death)    - INTENT:  \"Do you have thoughts of hurting or killing someone right NOW?\" (e.g., yes, no, N/A)    - PLAN: \"Do you have a specific plan for how you would do this?\" (e.g., gun, knife, no plan, N/A)       NO.   5. FUNCTIONAL IMPAIRMENT: \"How have " "things been going for you overall in your life? Have you had any more difficulties than usual doing your normal daily activities?\"  (e.g., better, same, worse; self-care, school, work, interactions)     Not very good.   6. SUPPORT: \"Who is with you now?\" \"Who do you live with?\" \"Do you have family or friends nearby who you can talk to?\"       Yes. Lives with a friend.   7. THERAPIST: \"Do you have a counselor or therapist? Name?\"      No   8. STRESSORS: \"Has there been any new stress or recent changes in your life?\"      Yes  9. DRUG ABUSE/ALCOHOL: \"Do you drink alcohol or use any illegal drugs?\"       No drugs. Once in awhile few beets.   10. OTHER: \"Do you have any other health or medical symptoms right now?\" (e.g., fever)        No   11. PREGNANCY: \"Is there any chance you are pregnant?\" \"When was your last menstrual period?\"      NA    Protocols used: DEPRESSION-A-OH      Bethany Avitia RN Flex    "

## 2020-01-07 ASSESSMENT — ANXIETY QUESTIONNAIRES: GAD7 TOTAL SCORE: 7

## 2020-01-07 NOTE — TELEPHONE ENCOUNTER
TC called patient and offered appointment Thursday. He declined because the time was to early. No other appointments available this week. TC offered to help get appointment for him at different locations. Patient was rude and hung up.  SABRINA Urias

## 2020-04-08 ENCOUNTER — VIRTUAL VISIT (OUTPATIENT)
Dept: PEDIATRICS | Facility: CLINIC | Age: 65
End: 2020-04-08
Payer: COMMERCIAL

## 2020-04-08 ENCOUNTER — TELEPHONE (OUTPATIENT)
Dept: PEDIATRICS | Facility: CLINIC | Age: 65
End: 2020-04-08

## 2020-04-08 DIAGNOSIS — F41.9 ANXIETY: Primary | ICD-10-CM

## 2020-04-08 PROCEDURE — 99442 ZZC PHYSICIAN TELEPHONE EVALUATION 11-20 MIN: CPT | Performed by: INTERNAL MEDICINE

## 2020-04-08 RX ORDER — LISINOPRIL 20 MG/1
20 TABLET ORAL DAILY
COMMUNITY
Start: 2020-04-08 | End: 2020-05-07

## 2020-04-08 RX ORDER — HYDROXYZINE HYDROCHLORIDE 25 MG/1
25 TABLET, FILM COATED ORAL 3 TIMES DAILY PRN
Qty: 30 TABLET | Refills: 1 | Status: SHIPPED | OUTPATIENT
Start: 2020-04-08

## 2020-04-08 RX ORDER — BUSPIRONE HYDROCHLORIDE 5 MG/1
5 TABLET ORAL 2 TIMES DAILY
Qty: 60 TABLET | Refills: 1 | Status: SHIPPED | OUTPATIENT
Start: 2020-04-08 | End: 2020-04-20

## 2020-04-08 RX ORDER — TRAZODONE HYDROCHLORIDE 150 MG/1
150 TABLET ORAL AT BEDTIME
COMMUNITY
Start: 2020-04-08 | End: 2020-04-16

## 2020-04-08 NOTE — PROGRESS NOTES
"Subjective     Michael Campbell is a 64 year old male who is being evaluated via a billable telephone visit.      The patient has been notified of following:     \"This telephone visit will be conducted via a call between you and your physician/provider. We have found that certain health care needs can be provided without the need for a physical exam.  This service lets us provide the care you need with a short phone conversation.  If a prescription is necessary we can send it directly to your pharmacy.  If lab work is needed we can place an order for that and you can then stop by our lab to have the test done at a later time.    Telephone visits are billed at different rates depending on your insurance coverage. During this emergency period, for some insurers they may be billed the same as an in-person visit.  Please reach out to your insurance provider with any questions.    If during the course of the call the physician/provider feels a telephone visit is not appropriate, you will not be charged for this service.\"    Patient has given verbal consent for Telephone visit?  Yes    Michael Campbell complains of   Chief Complaint   Patient presents with     Anxiety       ALLERGIES  Codeine; Sudafed [pseudoephedrine]; and Vicodin [hydrocodone-acetaminophen]    Patient Active Problem List   Diagnosis     Anxiety     Insomnia     Hypertension     Benzodiazepine dependence (H)     Chemical dependency (H)     Controlled substance agreement broken     Generalized anxiety disorder     Pt w/ longstanding hx of anxiety.  My last OV w/ him was in 2011.  He had an OV at our clinic w/ another provider last October. Prior was August 2018.  His care based on chart review has mainly been with [facility] but his last clinic contacts / telephone visits are through [facility].   Multiple visits there in the past year.    He set up a telephone visit today to discuss options for anxiety management.  He specifically is requesting " "benzodiazepine treatment.  In the past he had been prescribed clonazepam BID at dose of up to 2 mg per dose.   reviewed, based on that data last rx filled was in June of 2019.    He relates that clonazepam had worked very well for him in the past.  He initially requested 1 mg tablets.  Later in the conversation he requested 0.5 mg tablets.  Near the end of the conversation he requested Ativan 0.5 mg tablets.    On questioning he states that he had taken his medication only as rx in the past.    He did not mention his ED visit last July for a seizure, felt related to benzodiazepine and alcohol use.     He states he does not use alcohol any more.    Had taken several different medications for controller meds in the past.  He did not recall most of the medications he has tried, but in a recent note through an [facility] clinic he told them that Prozac did not work well for him.     I relayed my discomfort in prescribing benzodiazepines for him based on his history and prior significant event of seizure felt related to his medication use.    I offered alternative methods for symptom control.    He questions why \"no one wants to help me\". I reiterated that I am more than willing to help him with medications to mitigate his anxiety symptoms, but I am not willing to prescribe benzodiazepines.       ED visits:  1/3/20 - [facility] - anxiety. Requested but not given rx for benzodiazepines.  9/2/19 - [facility] - benzodiazepine withdrawal  7/14/19 - [facility] - Seizure, felt related to benzodiazepine withdrawal  12/1/18 - [facility] ED - Benzodiazepine withdrawal    Assessment/Plan:    ICD-10-CM    1. Anxiety  F41.9 busPIRone (BUSPAR) 5 MG tablet     hydrOXYzine (ATARAX) 25 MG tablet     Buspirone, initial dose 5 mg BID.  Can increase to 5 mg TID or 7.5 mg BID in as little as 7 days, then 10 mg BID if that dose is not effective.    Hydroxyzine rx sent in. He is concerned about prior fatigue with this medication. I suggest he " start with 1/2 tablet should it be needed.    Again I reinforced that I will not prescribe benzodiazepines for symptom control.    Should he want to f/u with me, should do so in 3-4 weeks to review his medications and make any necessary dose adjustments.       Phone call duration:  16 minutes    Jaxson Gallagher MD

## 2020-04-08 NOTE — TELEPHONE ENCOUNTER
Pt specifically requested Ativan 1mg for high anxiety. Please call pt with any questions. Pt stated for Rx to be sent to [facility], at [facility].    Mandy Atkinson on 4/8/2020 at 12:03 PM

## 2020-04-08 NOTE — TELEPHONE ENCOUNTER
LOV was on 10/24/19 with . Pt need a phone visit with any one of our providers to discuss his concern. OK to schedule a phone visit with  today.    Notes from 10/24/19:  Review of his history indicates that he has a history of anxiety.  He used to be on clonazepam and Ativan but says he has been off these for 6 or 7 months.  There is mention of benzodiazepine dependence in his chart.     He states that his psychiatric care has been through a doctor named Luis etienne who practices up in [facility] but it sounds like he has not seen him recently.     Presently he takes trazodone at night.  He is a bit unclear of the exact dose but it may be 200 or 300 mg.    Sridevi RN  Triage Nurse

## 2020-04-08 NOTE — TELEPHONE ENCOUNTER
This writer called and spoke with patient.  Patient is scheduled for a telephone visit with Dr. Gallagher today 04/08/20 at 1:45 pm.

## 2020-04-10 ENCOUNTER — TELEPHONE (OUTPATIENT)
Dept: PEDIATRICS | Facility: CLINIC | Age: 65
End: 2020-04-10

## 2020-04-10 DIAGNOSIS — F41.9 ANXIETY: Primary | ICD-10-CM

## 2020-04-10 NOTE — TELEPHONE ENCOUNTER
"Called pt back & advised as below. Pt said, its bad news\" & being quiet on the phone. Advised him that if hydroxyzine causing grogginess, he  Can try otc benadryl or increase buspar 5 mg tid.     Then pt said \"ok\" laughed for a sec & hung up.     Routing to SB4 PAL to f/u on pt on Monday.     Sridevi, RN  Triage Nurse          "

## 2020-04-10 NOTE — TELEPHONE ENCOUNTER
Please call pt:  I will not rx Ativan (or any benzodiazepine).  He may increase buspirone to 5 mg TID if he would like.

## 2020-04-10 NOTE — TELEPHONE ENCOUNTER
Drug Change Request  Who is calling?:  Michael  What is the current medication?:  Hydroxyzine  What alternative is being requested?: Ativan in a small dose of 1mg for 2wks    Why the request to change?:  Drug is making him groggy and knocks him out    Requested Pharmacy?: [facility] [address] [facility]  Okay to leave a detailed message?:  YES at Home number on file [phone] (home)

## 2020-04-13 NOTE — TELEPHONE ENCOUNTER
Called pt. No answer. LVM to call back on my personal extension.    If pt calls back:  Ask him if he increased the buspar or if he tried the benadryl?  Does he need to f/u with psych?  Reiterate that Dr. Gallagher will not rx benzo for him.    Anshul Baez, EMT at 11:54 AM on April 13, 2020   [facility] Health Guide   [phone]

## 2020-04-13 NOTE — TELEPHONE ENCOUNTER
He has not tried to increase the buspar or try the the benadryl. He notes he would like to follow up with psychiatry but does not currently have one. Pt notes increased anxiety and would like help with managing this. Pended psychiatry referral.    Anshul Baez, EMT at 1:02 PM on April 13, 2020   [facility] Health Guide   [phone]

## 2020-04-16 DIAGNOSIS — G47.00 INSOMNIA, UNSPECIFIED TYPE: Primary | ICD-10-CM

## 2020-04-16 RX ORDER — TRAZODONE HYDROCHLORIDE 150 MG/1
150 TABLET ORAL AT BEDTIME
Qty: 30 TABLET | Refills: 0 | Status: SHIPPED | OUTPATIENT
Start: 2020-04-16 | End: 2020-05-04

## 2020-04-16 NOTE — TELEPHONE ENCOUNTER
Rx signed x 1 month.   He will need to f/u next month if he opts to continue with health care through [facility].

## 2020-04-16 NOTE — TELEPHONE ENCOUNTER
Reason for call:  Medication   If this is a refill request, has the caller requested the refill from the pharmacy already? No  Will the patient be using a [facility]? No  Name of the pharmacy and phone number for the current request: [facility] on [address]  #[phone]    Name of the medication requested: trazadone      Phone number to reach patient:  Cell number on file:    Telephone Information:   Mobile [phone]       Best Time:  any    Can we leave a detailed message on this number?  YES    Travel screening: Not Applicable

## 2020-04-16 NOTE — TELEPHONE ENCOUNTER
Requested Prescriptions   Pending Prescriptions Disp Refills     traZODone (DESYREL) 150 MG tablet        Sig: Take 1 tablet (150 mg) by mouth At Bedtime       There is no refill protocol information for this order

## 2020-04-19 ENCOUNTER — NURSE TRIAGE (OUTPATIENT)
Dept: NURSING | Facility: CLINIC | Age: 65
End: 2020-04-19

## 2020-04-19 NOTE — TELEPHONE ENCOUNTER
Michael reports increasing anxiety. Requests for Ativan, FNA advised to have him discuss this with provider during clinic phone visit. States that he has not been getting enough sleep.    COVID 19 Nurse Triage Plan/Patient Instructions    Please be aware that novel coronavirus (COVID-19) may be circulating in the community. If you develop symptoms such as fever, cough, or SOB or if you have concerns about the presence of another infection including coronavirus (COVID-19), please contact your health care provider or visit [email or web address].     Disposition/Instructions    Patient to have scheduled Telephone Visit with a provider. Follow System Ambulatory Workflow for COVID 19.     The clinic staff will assist you to schedule an appointment to complete the Telephone Visit with a provider during normal clinic hours.       Call Back If: Your symptoms worsen before you are able to complete your Telephone Visit with a provider.    Thank you for limiting contact with others, wearing a simple mask to cover your cough, practice good hand hygiene habits and accessing our virtual services where possible to limit the spread of this virus.    For more information about COVID19 and options for caring for yourself at home, please visit the CDC website at https://www.cdc.gov/coronavirus/2019-ncov/about/steps-when-sick.html  For more options for care at [facility], please visit our website at [email or web address]    For more information, please use the [facility] of Health ([facility]) COVID-19 Hotlines (Interpreters available):     Health questions: Phone Number: [phone] or [phone] and Hours: 7 a.m. to 7 p.m.    Schools and  questions: Phone Number: [phone] or [phone] and Hours 7 a.m. to 7 p.m.          Whitney Wang RN/[place] Nurse Advisor      Reason for Disposition    Symptoms interfere with work or school    Additional Information    Negative: Severe  difficulty breathing (e.g., struggling for each breath, speaks in single words)    Negative: Bluish (or gray) lips or face now    Negative: Difficult to awaken or acting confused (e.g., disoriented, slurred speech)    Negative: Hysterical or combative behavior    Negative: Sounds like a life-threatening emergency to the triager    Negative: [1] Difficulty breathing AND [2] persists > 10 minutes AND [3] not relieved by reassurance provided by triager    Negative: [1] Lightheadedness or dizziness AND [2] persists > 10 minutes AND [3] not relieved by reassurance provided by triager    Negative: [1] Known or suspected alcohol or drug abuse AND [2] feeling very shaky (i.e., visible tremors of hands)    Negative: Patient sounds very sick or weak to the triager    Protocols used: ANXIETY AND PANIC ATTACK-A-AH

## 2020-04-20 NOTE — TELEPHONE ENCOUNTER
"Pt called cancelling his appointment 4/20, stating he would \"like to stick with Elliott.\"     Pt then requested a message sent to Dr. Gallagher's nurse stating, he wants to get back on Prozac for his anxiety but that doesn't kick in for two weeks. Pt again requested an Rx for Ativan of 0.5 mg once a day for two weeks until the Prozac kicks in, which he also requesting an Rx for.    Please call pt with questions.  Mandy Atkinson on 4/20/2020 at 8:29 AM    "

## 2020-04-20 NOTE — TELEPHONE ENCOUNTER
Pt called.  He stopped taking buspirone.   He relates that it did not work for him in the past.   He was not having side effects, but stopped taking.    He is interested in restarting fluoxetine. Rx sent to [facility] pharmacy.    I told him bluntly that I will not prescribe benzodiazepines in any form.    I did encourage him to take fluoxetine and to restart the buspirone, can take both together.

## 2020-04-28 ENCOUNTER — TELEPHONE (OUTPATIENT)
Dept: PEDIATRICS | Facility: CLINIC | Age: 65
End: 2020-04-28

## 2020-04-28 NOTE — TELEPHONE ENCOUNTER
Symptoms  Describe your symptoms: irritablitiy, prozak not working. Wants medication change   Any pain: No  How long have you been having symptoms: 3  weeks  Have you been seen for this:  Yes: 4/8  Appointment offered?: No  Triage offered?: No  Home remedies tried:   Requested Pharmacy: [facility]  Okay to leave a detailed message? Yes at Home number on file [phone] (home)

## 2020-04-28 NOTE — TELEPHONE ENCOUNTER
Patient returned call. Phone visit scheduled for 05/04/2020 at 1110.    Closing encounter.    Bayron Robins at 3:09 PM on 4/28/2020  [facility] Health Guide  Phone [phone]

## 2020-04-28 NOTE — TELEPHONE ENCOUNTER
CHG called to assist with scheduling Follow-up appointment. Patient did not answer, LV asking to call back on direction extension.    Bayron Robins at 2:18 PM on 4/28/2020  [facility] Health Guide  Phone [phone]

## 2020-04-28 NOTE — TELEPHONE ENCOUNTER
Per Dr. Gallagher's Virtual Visit note from 4/8/20:    Should he want to f/u with me, should do so in 3-4 weeks to review his medications and make any necessary dose adjustments    Encounter routed to the SB4 Pal to assist in scheduling patient. Kim Phillips RN on 4/28/2020 at 2:12 PM

## 2020-05-04 ENCOUNTER — VIRTUAL VISIT (OUTPATIENT)
Dept: PEDIATRICS | Facility: CLINIC | Age: 65
End: 2020-05-04
Payer: COMMERCIAL

## 2020-05-04 DIAGNOSIS — F41.9 ANXIETY: Primary | ICD-10-CM

## 2020-05-04 DIAGNOSIS — G47.00 INSOMNIA, UNSPECIFIED TYPE: ICD-10-CM

## 2020-05-04 PROCEDURE — 99214 OFFICE O/P EST MOD 30 MIN: CPT | Mod: 95 | Performed by: INTERNAL MEDICINE

## 2020-05-04 RX ORDER — TRAZODONE HYDROCHLORIDE 150 MG/1
150 TABLET ORAL AT BEDTIME
Qty: 30 TABLET | Refills: 2 | Status: SHIPPED | OUTPATIENT
Start: 2020-05-04 | End: 2020-05-07 | Stop reason: DRUGHIGH

## 2020-05-04 NOTE — PROGRESS NOTES
"Michael Campbell is a 64 year old male who is being evaluated via a billable telephone visit.      The patient has been notified of following:     \"This telephone visit will be conducted via a call between you and your physician/provider. We have found that certain health care needs can be provided without the need for a physical exam.  This service lets us provide the care you need with a short phone conversation.  If a prescription is necessary we can send it directly to your pharmacy.  If lab work is needed we can place an order for that and you can then stop by our lab to have the test done at a later time.    Telephone visits are billed at different rates depending on your insurance coverage. During this emergency period, for some insurers they may be billed the same as an in-person visit.  Please reach out to your insurance provider with any questions.    If during the course of the call the physician/provider feels a telephone visit is not appropriate, you will not be charged for this service.\"    Patient has given verbal consent for Telephone visit?  Yes    What phone number would you like to be contacted at? [phone]    How would you like to obtain your AVS? Patient declined AVS and no mychart wanted at this time.     Subjective     Michael Campbell is a 64 year old male who scheduled a telephone visit today for the following health issues:    HPI   Anxiety  He states that today he is doing \"so-so\".  Relates that he is still having significant anxiety symptoms.    Started fluoxetine 4/20/20.  Notes that his anxiety is not well controlled.  Waking around 3 am (goes to bed at 8 pm).    Tried hydroxyzine, caused drowsiness even with 1/2 pill.    Reviewed med options.  As documented previously, he has hx of benzodiazepine abuse so this is not an option.    Could increase fluoxetine to 40 mg daily.    Trazodone is helping with sleep.    Discussed could try hydroxyzine if wakes at 3 am (although that is approx " 7 hours of sleep and meds may not help).     Also reviewed the benefit of getting established with a psychiatrist.            Objective   Reported vitals:  There were no vitals taken for this visit.   PSYCH: Alert and oriented times 3; coherent speech, normal   rate and volume, able to articulate logical thoughts, able   to abstract reason, no tangential thoughts, no hallucinations   or delusions  His affect is normal  RESP: No cough, no audible wheezing, able to talk in full sentences  Remainder of exam unable to be completed due to telephone visits          Assessment/Plan:    ICD-10-CM    1. Anxiety  F41.9 FLUoxetine (PROZAC) 20 MG capsule   2. Insomnia, unspecified type  G47.00 traZODone (DESYREL) 150 MG tablet     Persistent significant anxiety symptoms    Plan:  -Increase fluoxetine to 40 mg daily  -Continue trazodone for sleep  -Try hydroxyzine prn if wakes earlier in the am than he would like  -Establish with psychiatry if possible.       Phone call duration:  7 minutes     Jaxson Gallagher MD

## 2020-05-06 ENCOUNTER — TELEPHONE (OUTPATIENT)
Dept: PEDIATRICS | Facility: CLINIC | Age: 65
End: 2020-05-06

## 2020-05-06 DIAGNOSIS — F41.9 ANXIETY: Primary | ICD-10-CM

## 2020-05-06 NOTE — TELEPHONE ENCOUNTER
CHG called patient with no answer. LV asking patient to call back on CHG direct extension.    Bayron Robins at 1:14 PM on 5/6/2020  [facility] Health Guide  Phone [phone]

## 2020-05-06 NOTE — TELEPHONE ENCOUNTER
Pt called.  I do not have an alternative for hydroxyzine.  He has not yet contacted psychiatry nor been contacted.   Referral was placed 4/13/20.  I placed a new referral today.   Advised that he should not be using foul language when talking to anyone at the clinic.

## 2020-05-06 NOTE — TELEPHONE ENCOUNTER
"Routing to SB4 Team to follow up with the patient.     Per 5/4/2020 Virtual Visit with Dr. Gallagher, patient has history of benzodiazepine abuse. These meds would not be an option.   Patient likely could trial Hydroxyzine again.     Please inform patient then follow up with Dr. Gallagher as needed.    Copied from Virtual Visit:  \"Reviewed med options.  As documented previously, he has hx of benzodiazepine abuse so this is not an option.\"  "

## 2020-05-06 NOTE — TELEPHONE ENCOUNTER
Patient called back, when relayed message about Hydroxyzine being an option patient got upset with [facility] and began yelling and cursing at [facility]. Patient said that Hydroxyzine  Doesn't do anything to help his anxiety and only knocks him out. Patient continued yelling and cursing at [facility] before hanging up.    Will rout to PCP for them to advise.    Bayron Robins at 2:29 PM on 5/6/2020  [facility] Health Guide  Phone [phone]

## 2020-05-06 NOTE — TELEPHONE ENCOUNTER
"  Medication Question or Refill  Who is calling: Patient  What medication are you calling about (include dose and sig)?: Nothing specified  Controlled Substance Agreement on file: No  Who prescribed the medication?: Elliott  Do you need a refill? No  When did you use the medication last? N/A  Patient offered an appointment? No  Do you have any questions or concerns?  Yes: Pt states they are needing something to \"calm them down fast\" and that the recommendation for Rx via [place] from virtual visit on 5/4 is not working. They are requesting a new medication and mentioned they spoke about \"benzos.\" Pt is very worried about the current situation with Covid and it is causing him a lot of anxiety that is hard to control. Please contact to discuss and advise options for care.  Requested Pharmacy: did not provide  Okay to leave a detailed message?: Yes at Home number on file [phone] (home)                    "

## 2020-05-07 ENCOUNTER — TELEPHONE (OUTPATIENT)
Dept: PEDIATRICS | Facility: CLINIC | Age: 65
End: 2020-05-07

## 2020-05-07 DIAGNOSIS — I10 ESSENTIAL HYPERTENSION: Primary | ICD-10-CM

## 2020-05-07 DIAGNOSIS — G47.00 INSOMNIA, UNSPECIFIED TYPE: ICD-10-CM

## 2020-05-07 RX ORDER — LISINOPRIL 20 MG/1
20 TABLET ORAL DAILY
Qty: 90 TABLET | Refills: 1 | Status: SHIPPED | OUTPATIENT
Start: 2020-05-07

## 2020-05-07 RX ORDER — TRAZODONE HYDROCHLORIDE 100 MG/1
200 TABLET ORAL AT BEDTIME
Status: CANCELLED | OUTPATIENT
Start: 2020-05-07

## 2020-05-07 RX ORDER — TRAZODONE HYDROCHLORIDE 100 MG/1
200 TABLET ORAL AT BEDTIME
Qty: 60 TABLET | Refills: 2 | Status: SHIPPED | OUTPATIENT
Start: 2020-05-07 | End: 2020-07-28

## 2020-05-07 NOTE — TELEPHONE ENCOUNTER
-    Pt states he is currently taking trazadone 300 mg for sleep. Rx is for     traZODone (DESYREL) 150 MG tablet  30 tablet  2  5/4/2020   No    Sig - Route: Take 1 tablet (150 mg) by mouth At Bedtime - Oral    Sent to pharmacy as: traZODone HCl 150 MG Oral Tablet (DESYREL     Pt out of medication. Will route to provider for review. Jeaneth Ramos RN on 5/7/2020 at 8:18 AM

## 2020-05-07 NOTE — TELEPHONE ENCOUNTER
He is agreeable to taking to 200mg daily. Would like a call when we send it in. Jeaneth Ramos RN on 5/7/2020 at 11:53 AM

## 2020-05-07 NOTE — TELEPHONE ENCOUNTER
CHG called and left detailed message letting them know Rx have been sent in.    Bayron Robins at 12:59 PM on 5/7/2020  [facility] Health Guide  Phone [phone]

## 2020-05-07 NOTE — TELEPHONE ENCOUNTER
General Call:   Who is calling:  Patient calling he would first like to apologize for yesterday says that he was having a bad day and anxiety was bad. He did not sleep last night and he is out of his Trazodone. The pharmacy will not refill his medication because he states that he takes 300mg not 150mg. He would like Dr Gallagher to send a refill of Trazodone for 300mg. He states that he has been taking 300mg dosage for quite awhile now and that 150mg will not work. He needs this to help him sleep at night and it also helps with his anxiety. Patient is also requesting a refill of his Lisinopril 20 mg.  Reason for Call:  refill  What are your questions or concerns:  Refill and different dosage  Date of last appointment with provider: 10/24/19 with Dr Luis Alfredo Tejeda to leave a detailed message:No at Home number on file [phone] (home)

## 2020-05-07 NOTE — TELEPHONE ENCOUNTER
I reviewed his chart, all prior rx for trazodone were for 150 mg.  200 mg is maximal recommended dosing to treat insomnia.  I can send in a prescription for 100 mg tablets with directions 2 at bedtime.  Please call to let him know.

## 2020-07-27 DIAGNOSIS — G47.00 INSOMNIA, UNSPECIFIED TYPE: ICD-10-CM

## 2020-07-28 RX ORDER — TRAZODONE HYDROCHLORIDE 100 MG/1
200 TABLET ORAL AT BEDTIME
Qty: 60 TABLET | Refills: 1 | Status: SHIPPED | OUTPATIENT
Start: 2020-07-28 | End: 2020-10-01

## 2020-09-07 ENCOUNTER — TELEPHONE (OUTPATIENT)
Dept: PEDIATRICS | Facility: CLINIC | Age: 65
End: 2020-09-07

## 2020-09-07 DIAGNOSIS — F41.9 ANXIETY: ICD-10-CM

## 2020-09-08 NOTE — TELEPHONE ENCOUNTER
Please advise on pharmacy note below, per last visit note on 5/4/20:    Reviewed med options.  As documented previously, he has hx of benzodiazepine abuse so this is not an option.     Could increase fluoxetine to 40 mg daily.     Trazodone is helping with sleep.     Discussed could try hydroxyzine if wakes at 3 am (although that is approx 7 hours of sleep and meds may not help).      Also reviewed the benefit of getting established with a psychiatrist.     Elva HAM RN, BSN

## 2020-10-01 DIAGNOSIS — G47.00 INSOMNIA, UNSPECIFIED TYPE: ICD-10-CM

## 2020-10-01 RX ORDER — TRAZODONE HYDROCHLORIDE 100 MG/1
200 TABLET ORAL AT BEDTIME
Qty: 60 TABLET | Refills: 1 | Status: SHIPPED | OUTPATIENT
Start: 2020-10-01

## 2020-10-01 NOTE — TELEPHONE ENCOUNTER
Routing refill request to provider for review/approval because:  Strength of prescription is greater then recommended range.     Kim Phillips RN on 10/1/2020 at 10:55 AM

## 2021-06-12 NOTE — MR AVS SNAPSHOT
After Visit Summary   12/6/2018    Michael Campbell    MRN: [id number]           Patient Information     Date Of Birth          1955        Visit Information        Provider Department      12/6/2018 1:45 PM [facility]        Today's Diagnoses     Anxiety    -  1    Benzodiazepine dependence (H)        Insomnia, unspecified type           Follow-ups after your visit        Additional Services     MENTAL HEALTH REFERRAL  - Adult; Psychiatry and Medication Management; [place]ry[place] [facility] [place] [facility] [phone].  Medication management & future refills will be returned to G PCP upon completion of evaluation; We lyndsey...       All scheduling is subject to the client's specific insurance plan & benefits, provider/location availability, and provider clinical specialities.  Please arrive 15 minutes early for your first appointment and bring your completed paperwork.    Please be aware that coverage of these services is subject to the terms and limitations of your health insurance plan.  Call member services at your health plan with any benefit or coverage questions.                            Who to contact     If you have questions or need follow up information about today's clinic visit or your schedule please contact [facility] directly at [phone].  Normal or non-critical lab and imaging results will be communicated to you by MyChart, letter or phone within 4 business days after the clinic has received the results. If you do not hear from us within 7 days, please contact the clinic through MyChart or phone. If you have a critical or abnormal lab result, we will notify you by phone as soon as possible.  Submit refill requests through [employer] or call your pharmacy and they will forward the refill request to us. Please allow 3 business days for your refill to be completed.          Additional Information About Your Visit       "  [facility] [employer] Information     [employer] lets you send messages to your doctor, view your test results, renew your prescriptions, schedule appointments and more. To sign up, go to [email or web address] . Click on \"Log in\" on the left side of the screen, which will take you to the Welcome page. Then click on \"Sign up Now\" on the right side of the page.     You will be asked to enter the access code listed below, as well as some personal information. Please follow the directions to create your username and password.     Your access code is: [email or web address]  Expires: 3/1/2019 11:34 AM     Your access code will  in 90 days. If you need help or a new code, please call your [facility] clinic or [phone].        Care EveryWhere ID     This is your Care EveryWhere ID. This could be used by other organizations to access your [facility] medical records  [id number]        Your Vitals Were     Pulse Temperature Height Pulse Oximetry BMI (Body Mass Index)       109 98.3  F (36.8  C) (Oral) 5' 7.5\" (1.715 m) 94% 25.74 kg/m2        Blood Pressure from Last 3 Encounters:   18 (!) 150/100   18 (!) 142/98   18 136/82    Weight from Last 3 Encounters:   18 166 lb 12.8 oz (75.7 kg)   18 171 lb 11.8 oz (77.9 kg)   18 173 lb 3.2 oz (78.6 kg)              We Performed the Following     MENTAL HEALTH REFERRAL  - Adult; Psychiatry and Medication Management; Psychiatry; G: [facility] [phone].  Medication management & future refills will be returned to G PCP upon completion of evaluation; We lyndsey...          Today's Medication Changes          These changes are accurate as of 18  2:36 PM.  If you have any questions, ask your nurse or doctor.               Start taking these medicines.        Dose/Directions    propranolol 10 MG tablet   Commonly known as:  INDERAL   Used for:  Benzodiazepine dependence (H), Anxiety   Started by:  Katie Echevarria MD        " Dose:  10 mg   Take 1 tablet (10 mg) by mouth 2 times daily   Quantity:  30 tablet   Refills:  1       sertraline 25 MG tablet   Commonly known as:  ZOLOFT   Used for:  Anxiety   Started by:  Katie Echevarria MD        Dose:  25 mg   Take 1 tablet (25 mg) by mouth daily   Quantity:  30 tablet   Refills:  0            Where to get your medicines      These medications were sent to [facility] [place] - [address]   [address]  [address], [place] [address]     Phone:  [phone]     propranolol 10 MG tablet    sertraline 25 MG tablet                Primary Care Provider Office Phone # Fax #    [facility] [phone]       [address]  [place] [address]        Equal Access to Services     [facility] Hospital[facility]AIRE : Hadii angie lomeli hadasho Soomaali, waaxda luqadaha, qaybta kaalmada adeegyada, paty harpin hayjeff starks . So [facility] [phone].    ATENCIÓN: Si habla español, tiene a rehman disposición servicios gratuitos de asistencia lingüística. Llame al [phone].    We comply with applicable federal civil rights laws and [place] laws. We do not discriminate on the basis of race, color, national origin, age, disability, sex, sexual orientation, or gender identity.            Thank you!     Thank you for choosing [facility]  for your care. Our goal is always to provide you with excellent care. Hearing back from our patients is one way we can continue to improve our services. Please take a few minutes to complete the written survey that you may receive in the mail after your visit with us. Thank you!             Your Updated Medication List - Protect others around you: Learn how to safely use, store and throw away your medicines at www.disposemymeds.org.          This list is accurate as of 12/6/18  2:36 PM.  Always use your most recent med list.                   Brand Name Dispense Instructions for use Diagnosis     lisinopril-hydrochlorothiazide 20-12.5 MG tablet    PRINZIDE/ZESTORETIC     Take 1 tablet by mouth        LORazepam 0.5 MG tablet    ATIVAN    15 tablet    Take 1-2 tablets (0.5-1 mg) by mouth every 8 hours as needed for anxiety        propranolol 10 MG tablet    INDERAL    30 tablet    Take 1 tablet (10 mg) by mouth 2 times daily    Benzodiazepine dependence (H), Anxiety       sertraline 25 MG tablet    ZOLOFT    30 tablet    Take 1 tablet (25 mg) by mouth daily    Anxiety       traZODone 100 MG tablet    DESYREL    10 tablet    Take 2 tablets by mouth At Bedtime.    Anxiety, Insomnia           used

## 2021-09-20 RX ORDER — LORAZEPAM 0.5 MG/1
.5-1 TABLET ORAL EVERY 8 HOURS PRN
Qty: 15 TABLET | Refills: 0 | Status: CANCELLED | OUTPATIENT
Start: 2021-09-20

## 2021-09-20 NOTE — TELEPHONE ENCOUNTER
LORazepam (ATIVAN) tablet 1 mg: Medication requested from pharmacy only shows previous IP use. Also, unable to reorder off med list in the requested dose (1mg). Will reorder in dose available.

## 2021-09-20 NOTE — TELEPHONE ENCOUNTER
Routing refill request to provider for review/approval because:  Drug not on the [facility] refill protocol     Jessica Jones RN   [facility]  -- Triage Nurse

## 2021-09-20 NOTE — TELEPHONE ENCOUNTER
Inappropriate refill request.   He is no longer seeing me.  See prior notes regarding inappropriate lorazepam use.  I will not authorize any refills for this medication.

## 2024-07-09 ENCOUNTER — HOSPITAL ENCOUNTER (EMERGENCY)
Facility: CLINIC | Age: 69
Discharge: HOME | End: 2024-07-09
Attending: EMERGENCY MEDICINE | Admitting: EMERGENCY MEDICINE
Payer: COMMERCIAL

## 2024-07-09 VITALS
BODY MASS INDEX: 22.79 KG/M2 | SYSTOLIC BLOOD PRESSURE: 143 MMHG | HEART RATE: 82 BPM | WEIGHT: 147.71 LBS | OXYGEN SATURATION: 100 % | RESPIRATION RATE: 18 BRPM | TEMPERATURE: 97.5 F | DIASTOLIC BLOOD PRESSURE: 90 MMHG

## 2024-07-09 DIAGNOSIS — R35.0 URINARY FREQUENCY: ICD-10-CM

## 2024-07-09 LAB
ALBUMIN UR-MCNC: 10 MG/DL
APPEARANCE UR: CLEAR
BILIRUB UR QL STRIP: NEGATIVE
CAOX CRY #/AREA URNS HPF: ABNORMAL /HPF
COLOR UR AUTO: YELLOW
GLUCOSE UR STRIP-MCNC: NEGATIVE MG/DL
HGB UR QL STRIP: NEGATIVE
HYALINE CASTS: 1 /LPF
KETONES UR STRIP-MCNC: NEGATIVE MG/DL
LEUKOCYTE ESTERASE UR QL STRIP: NEGATIVE
MUCOUS THREADS #/AREA URNS LPF: PRESENT /LPF
NITRATE UR QL: NEGATIVE
PH UR STRIP: 6 [PH] (ref 5–7)
RBC URINE: 1 /HPF
SP GR UR STRIP: 1.02 (ref 1–1.03)
SPERM #/AREA URNS HPF: PRESENT /HPF
SQUAMOUS EPITHELIAL: <1 /HPF
UROBILINOGEN UR STRIP-MCNC: NORMAL MG/DL
WBC URINE: 1 /HPF

## 2024-07-09 PROCEDURE — 51798 US URINE CAPACITY MEASURE: CPT

## 2024-07-09 PROCEDURE — 99284 EMERGENCY DEPT VISIT MOD MDM: CPT

## 2024-07-09 PROCEDURE — 81001 URINALYSIS AUTO W/SCOPE: CPT | Performed by: EMERGENCY MEDICINE

## 2024-07-09 ASSESSMENT — ACTIVITIES OF DAILY LIVING (ADL)
ADLS_ACUITY_SCORE: 35
ADLS_ACUITY_SCORE: 33

## 2024-07-09 ASSESSMENT — COLUMBIA-SUICIDE SEVERITY RATING SCALE - C-SSRS
2. HAVE YOU ACTUALLY HAD ANY THOUGHTS OF KILLING YOURSELF IN THE PAST MONTH?: NO
1. IN THE PAST MONTH, HAVE YOU WISHED YOU WERE DEAD OR WISHED YOU COULD GO TO SLEEP AND NOT WAKE UP?: NO
6. HAVE YOU EVER DONE ANYTHING, STARTED TO DO ANYTHING, OR PREPARED TO DO ANYTHING TO END YOUR LIFE?: NO

## 2024-07-09 NOTE — ED TRIAGE NOTES
Pt to ER w c/o UTI symptoms and abdominal pain. Pt states he has been getting up to go to the bathroom every couple hours. Endorses dysuria. States he has also been having loose stool. Rates pain 10/10, describes it as cramping. Pt acting strange in triage. Hx of alcohol abuse and prostatitis. VSS, ABCs intact, A&Ox4

## 2024-07-09 NOTE — ED PROVIDER NOTES
Emergency Department Note      History of Present Illness     Chief Complaint   Urinary Frequency    HPI   Michael Campbell is a 68 year old male with a history of alcohol abuse, anxiety, and depressive disorder who presents to the ED for evaluation of urinary frequency. The patient reports that for months he has been having incomplete urinary void. Denies any fever or episodes of emesis.  No pain with urination or blood in the urine.  No discharge.  No other complaints at this time    Independent Historian   None    Review of External Notes   None    Past Medical History     Medical History and Problem List   Past Medical History:   Diagnosis Date    Alcohol abuse     Anxiety     Depressive disorder     Hypertension        Medications   FLUoxetine (PROZAC) 20 MG capsule  hydrOXYzine (ATARAX) 25 MG tablet  lisinopril (ZESTRIL) 20 MG tablet  traZODone (DESYREL) 100 MG tablet        Surgical History   Past Surgical History:   Procedure Laterality Date    ARTHROSCOPY KNEE      ARTHROSCOPY SHOULDER ROTATOR CUFF REPAIR      CATARACT EXTRACTION Bilateral     TESTICLE SURGERY         Physical Exam     Patient Vitals for the past 24 hrs:   BP Temp Temp src Pulse Resp SpO2 Weight   07/09/24 0205 137/83 97.5  F (36.4  C) Temporal 76 18 98 % --   07/09/24 0202 -- -- -- -- -- -- 67 kg (147 lb 11.3 oz)     Physical Exam  Nursing note and vitals reviewed.  Constitutional: Cooperative.  Walking about comfortably in the room    Cardiovascular: Normal rate, regular rhythm and normal heart sounds.  No murmur.  Pulmonary/Chest: Effort normal and breath sounds normal. No respiratory distress. No wheezes. No rales.   Abdominal: Soft. Normal appearance.  No tenderness.  No guarding or rigidity  Neurological: Alert.  Oriented x 3  Skin: Skin is warm and dry.   Psychiatric: Normal mood and affect.      Diagnostics     Lab Results   Labs Ordered and Resulted from Time of ED Arrival to Time of ED Departure   ROUTINE UA WITH MICROSCOPIC  REFLEX TO CULTURE - Abnormal       Result Value    Color Urine Yellow      Appearance Urine Clear      Glucose Urine Negative      Bilirubin Urine Negative      Ketones Urine Negative      Specific Gravity Urine 1.024      Blood Urine Negative      pH Urine 6.0      Protein Albumin Urine 10 (*)     Urobilinogen Urine Normal      Nitrite Urine Negative      Leukocyte Esterase Urine Negative      Mucus Urine Present (*)     Calcium Oxalate Crystals Urine Moderate (*)     Sperm Urine Present (*)     RBC Urine 1      WBC Urine 1      Squamous Epithelials Urine <1      Hyaline Casts Urine 1         Imaging   No orders to display     Independent Interpretation   None    ED Course      Medications Administered   Medications - No data to display    Discussion of Management   None    ED Course   ED Course as of 07/09/24 0542   e Jul 09, 2024   0310 I reviewed care everywhere and updated Epic.    0312 I obtained history and examined the patient as noted above.    0346 I rechecked and updated the patient.        Optional/Additional Documentation  None    Medical Decision Making / Diagnosis     AMADOU Campbell is a 68 year old male who presents with urinary frequency over the past several months.  It is unclear what change prompted him to come to the emergency department this evening.  Urinalysis is not concerning for infection.  Postvoid bladder scan does not show retention.  His abdominal exam is normal.  No fevers.  No discharge or history of hematuria or flank pain.  At this time I have referred him to urology given the chronicity of his symptoms.  Would not perform advanced imaging or further workup at this time.    Disposition   The patient was discharged.     Diagnosis     ICD-10-CM    1. Urinary frequency  R35.0        Scribe Disclosure:  Daysi ESTRADA, am serving as a scribe at 3:18 AM on 7/9/2024 to document services personally performed by Michael Burleson MD based on my observations and the provider's  statements to me.        Michael Burleson MD  07/09/24 0566

## 2024-07-15 NOTE — TELEPHONE ENCOUNTER
I called pt, again reached [facility]J.  TERENCE.  If he calls back, OK to review his results - all are normal.   If he continues to have symptoms, he should schedule a f/u visit.    The patient is a 89y Male complaining of back pain general.

## 2024-07-23 ENCOUNTER — LAB (OUTPATIENT)
Dept: LAB | Facility: CLINIC | Age: 69
End: 2024-07-23
Payer: COMMERCIAL

## 2024-07-23 DIAGNOSIS — R41.3 MEMORY LOSS: Primary | ICD-10-CM

## 2024-07-23 DIAGNOSIS — R46.89: ICD-10-CM

## 2024-07-23 DIAGNOSIS — R41.89 AKINETIC MUTISM: ICD-10-CM

## 2024-07-23 DIAGNOSIS — E55.9 AVITAMINOSIS D: ICD-10-CM

## 2024-07-23 DIAGNOSIS — E83.52 HYPERCALCEMIA: ICD-10-CM

## 2024-07-23 LAB
CALCIUM SERPL-MCNC: 11.9 MG/DL (ref 8.8–10.4)
PTH-INTACT SERPL-MCNC: 10 PG/ML (ref 15–65)
VIT B12 SERPL-MCNC: 475 PG/ML (ref 232–1245)
VIT D+METAB SERPL-MCNC: 53 NG/ML (ref 20–50)

## 2024-07-23 PROCEDURE — 82310 ASSAY OF CALCIUM: CPT

## 2024-07-23 PROCEDURE — 86341 ISLET CELL ANTIBODY: CPT

## 2024-07-23 PROCEDURE — 36415 COLL VENOUS BLD VENIPUNCTURE: CPT

## 2024-07-23 PROCEDURE — 82607 VITAMIN B-12: CPT

## 2024-07-23 PROCEDURE — 83970 ASSAY OF PARATHORMONE: CPT

## 2024-07-23 PROCEDURE — 83921 ORGANIC ACID SINGLE QUANT: CPT

## 2024-07-23 PROCEDURE — 82306 VITAMIN D 25 HYDROXY: CPT

## 2024-07-24 LAB — METHYLMALONATE SERPL-SCNC: 0.32 UMOL/L (ref 0–0.4)

## 2024-07-31 LAB
AMPAR2 IGG SERPL QL CBA IFA: NEGATIVE
AMPHIPHYSIN IGG SER QL IA: NEGATIVE
ANNOTATION COMMENT IMP: NORMAL
CASPR2 IGG SER QL CBA IFA: NEGATIVE
CV2 AB SERPL QL IF: NEGATIVE
DPPX IGG SER QL CBA IFA: NEGATIVE
GABABR IGG SERPL QL CBA IFA: NEGATIVE
GAD65 AB SER-SCNC: 0.01 NMOL/L
GFAP ALPHA IGG SER QL IF: NEGATIVE
GLIAL NUC TYPE 1 AB SER QL IF: NEGATIVE
HU1 AB SER QL: NEGATIVE
HU2 AB SER QL IF: NEGATIVE
HU3 AB SER QL: NEGATIVE
IGLON5 IGG SER QL CBA IFA: NEGATIVE
IMMUNOLOGIST REVIEW: NORMAL
LGI1 IGG SER QL CBA IFA: NEGATIVE
MGLUR1 IGG SER QL IF: NEGATIVE
NEUROCHONDRIN AB SERPL QL IF: NEGATIVE
NIF IGG SER QL IF: NEGATIVE
NMDAR1 IGG SER QL CBA IFA: NEGATIVE
PCA-1 AB SER QL IF: NEGATIVE
PCA-2 AB SER QL IF: NEGATIVE
PCA-TR AB SER QL IF: NEGATIVE
PDE10A AB SPEC QL IF: NEGATIVE
SEPTIN-7 IGG SERPL QL IF: NEGATIVE
TRIM46 SPEC QL IF: NEGATIVE

## 2024-11-21 ENCOUNTER — LAB REQUISITION (OUTPATIENT)
Dept: LAB | Facility: CLINIC | Age: 69
End: 2024-11-21
Payer: COMMERCIAL

## 2024-11-21 DIAGNOSIS — E03.9 HYPOTHYROIDISM, UNSPECIFIED: ICD-10-CM

## 2024-11-21 DIAGNOSIS — E83.42 HYPOMAGNESEMIA: ICD-10-CM

## 2024-11-21 DIAGNOSIS — D64.9 ANEMIA, UNSPECIFIED: ICD-10-CM

## 2024-11-21 DIAGNOSIS — E55.9 VITAMIN D DEFICIENCY, UNSPECIFIED: ICD-10-CM

## 2024-11-21 DIAGNOSIS — I10 ESSENTIAL (PRIMARY) HYPERTENSION: ICD-10-CM

## 2024-11-26 LAB
ALBUMIN SERPL BCG-MCNC: 4.2 G/DL (ref 3.5–5.2)
ALP SERPL-CCNC: 80 U/L (ref 40–150)
ALT SERPL W P-5'-P-CCNC: 25 U/L (ref 0–70)
ANION GAP SERPL CALCULATED.3IONS-SCNC: 8 MMOL/L (ref 7–15)
AST SERPL W P-5'-P-CCNC: 40 U/L (ref 0–45)
BILIRUB SERPL-MCNC: 0.3 MG/DL
BUN SERPL-MCNC: 22.8 MG/DL (ref 8–23)
CALCIUM SERPL-MCNC: 9.6 MG/DL (ref 8.8–10.4)
CHLORIDE SERPL-SCNC: 98 MMOL/L (ref 98–107)
CHOLEST SERPL-MCNC: 153 MG/DL
CREAT SERPL-MCNC: 1 MG/DL (ref 0.67–1.17)
EGFRCR SERPLBLD CKD-EPI 2021: 80 ML/MIN/1.73M2
ERYTHROCYTE [DISTWIDTH] IN BLOOD BY AUTOMATED COUNT: 12.5 % (ref 10–15)
FASTING STATUS PATIENT QL REPORTED: NO
GLUCOSE SERPL-MCNC: 89 MG/DL (ref 70–99)
HCO3 SERPL-SCNC: 26 MMOL/L (ref 22–29)
HCT VFR BLD AUTO: 43.8 % (ref 40–53)
HDLC SERPL-MCNC: 65 MG/DL
HGB BLD-MCNC: 13.7 G/DL (ref 13.3–17.7)
LDLC SERPL CALC-MCNC: 78 MG/DL
MAGNESIUM SERPL-MCNC: 2.2 MG/DL (ref 1.7–2.3)
MCH RBC QN AUTO: 30.2 PG (ref 26.5–33)
MCHC RBC AUTO-ENTMCNC: 31.3 G/DL (ref 31.5–36.5)
MCV RBC AUTO: 97 FL (ref 78–100)
NONHDLC SERPL-MCNC: 88 MG/DL
PLATELET # BLD AUTO: 313 10E3/UL (ref 150–450)
POTASSIUM SERPL-SCNC: 4.9 MMOL/L (ref 3.4–5.3)
PROT SERPL-MCNC: 7.4 G/DL (ref 6.4–8.3)
RBC # BLD AUTO: 4.53 10E6/UL (ref 4.4–5.9)
SODIUM SERPL-SCNC: 132 MMOL/L (ref 135–145)
TRIGL SERPL-MCNC: 52 MG/DL
TSH SERPL DL<=0.005 MIU/L-ACNC: 4.87 UIU/ML (ref 0.3–4.2)
VIT B12 SERPL-MCNC: 400 PG/ML (ref 232–1245)
VIT D+METAB SERPL-MCNC: 52 NG/ML (ref 20–50)
WBC # BLD AUTO: 7.8 10E3/UL (ref 4–11)

## 2025-04-22 ENCOUNTER — LAB REQUISITION (OUTPATIENT)
Dept: LAB | Facility: CLINIC | Age: 70
End: 2025-04-22
Payer: COMMERCIAL

## 2025-04-22 DIAGNOSIS — F39 UNSPECIFIED MOOD (AFFECTIVE) DISORDER: ICD-10-CM

## 2025-04-22 DIAGNOSIS — N18.31 CHRONIC KIDNEY DISEASE, STAGE 3A (H): ICD-10-CM

## 2025-04-22 DIAGNOSIS — F03.B3 UNSPECIFIED DEMENTIA, MODERATE, WITH MOOD DISTURBANCE (H): ICD-10-CM

## 2025-04-22 DIAGNOSIS — K64.4 RESIDUAL HEMORRHOIDAL SKIN TAGS: ICD-10-CM

## 2025-04-22 DIAGNOSIS — I13.10 HYPERTENSIVE HEART AND CHRONIC KIDNEY DISEASE WITHOUT HEART FAILURE, WITH STAGE 1 THROUGH STAGE 4 CHRONIC KIDNEY DISEASE, OR UNSPECIFIED CHRONIC KIDNEY DISEASE: ICD-10-CM

## 2025-04-29 LAB
ANION GAP SERPL CALCULATED.3IONS-SCNC: 9 MMOL/L (ref 7–15)
BUN SERPL-MCNC: 8.3 MG/DL (ref 8–23)
CALCIUM SERPL-MCNC: 9.6 MG/DL (ref 8.8–10.4)
CHLORIDE SERPL-SCNC: 99 MMOL/L (ref 98–107)
CREAT SERPL-MCNC: 0.99 MG/DL (ref 0.67–1.17)
EGFRCR SERPLBLD CKD-EPI 2021: 81 ML/MIN/1.73M2
ERYTHROCYTE [DISTWIDTH] IN BLOOD BY AUTOMATED COUNT: 12 % (ref 10–15)
GLUCOSE SERPL-MCNC: 97 MG/DL (ref 70–99)
HCO3 SERPL-SCNC: 26 MMOL/L (ref 22–29)
HCT VFR BLD AUTO: 44.5 % (ref 40–53)
HGB BLD-MCNC: 13.9 G/DL (ref 13.3–17.7)
MCH RBC QN AUTO: 30 PG (ref 26.5–33)
MCHC RBC AUTO-ENTMCNC: 31.2 G/DL (ref 31.5–36.5)
MCV RBC AUTO: 96 FL (ref 78–100)
PLATELET # BLD AUTO: 313 10E3/UL (ref 150–450)
POTASSIUM SERPL-SCNC: 4.5 MMOL/L (ref 3.4–5.3)
RBC # BLD AUTO: 4.63 10E6/UL (ref 4.4–5.9)
SODIUM SERPL-SCNC: 134 MMOL/L (ref 135–145)
WBC # BLD AUTO: 6.4 10E3/UL (ref 4–11)